# Patient Record
Sex: FEMALE | Race: WHITE | NOT HISPANIC OR LATINO | Employment: FULL TIME | URBAN - METROPOLITAN AREA
[De-identification: names, ages, dates, MRNs, and addresses within clinical notes are randomized per-mention and may not be internally consistent; named-entity substitution may affect disease eponyms.]

---

## 2018-11-28 ENCOUNTER — OFFICE VISIT (OUTPATIENT)
Dept: URGENT CARE | Facility: CLINIC | Age: 48
End: 2018-11-28

## 2018-11-28 VITALS
HEART RATE: 100 BPM | TEMPERATURE: 99 F | WEIGHT: 188 LBS | BODY MASS INDEX: 30.22 KG/M2 | HEIGHT: 66 IN | RESPIRATION RATE: 18 BRPM | OXYGEN SATURATION: 96 % | DIASTOLIC BLOOD PRESSURE: 75 MMHG | SYSTOLIC BLOOD PRESSURE: 113 MMHG

## 2018-11-28 DIAGNOSIS — J20.9 ACUTE BRONCHITIS, UNSPECIFIED ORGANISM: Primary | ICD-10-CM

## 2018-11-28 PROCEDURE — 96372 THER/PROPH/DIAG INJ SC/IM: CPT | Mod: S$GLB,,, | Performed by: FAMILY MEDICINE

## 2018-11-28 PROCEDURE — 99202 OFFICE O/P NEW SF 15 MIN: CPT | Mod: S$GLB,,, | Performed by: FAMILY MEDICINE

## 2018-11-28 RX ORDER — BENZONATATE 100 MG/1
100 CAPSULE ORAL EVERY 6 HOURS PRN
Qty: 30 CAPSULE | Refills: 1 | Status: SHIPPED | OUTPATIENT
Start: 2018-11-28 | End: 2019-11-28

## 2018-11-28 RX ORDER — BETAMETHASONE SODIUM PHOSPHATE AND BETAMETHASONE ACETATE 3; 3 MG/ML; MG/ML
6 INJECTION, SUSPENSION INTRA-ARTICULAR; INTRALESIONAL; INTRAMUSCULAR; SOFT TISSUE
Status: COMPLETED | OUTPATIENT
Start: 2018-11-28 | End: 2018-11-28

## 2018-11-28 RX ORDER — ALBUTEROL SULFATE 90 UG/1
2 AEROSOL, METERED RESPIRATORY (INHALATION) EVERY 6 HOURS PRN
Qty: 18 G | Refills: 0 | Status: SHIPPED | OUTPATIENT
Start: 2018-11-28 | End: 2021-11-09

## 2018-11-28 RX ORDER — DOXYCYCLINE 100 MG/1
100 CAPSULE ORAL 2 TIMES DAILY
Qty: 14 CAPSULE | Refills: 0 | Status: SHIPPED | OUTPATIENT
Start: 2018-11-28 | End: 2018-12-05

## 2018-11-28 RX ADMIN — BETAMETHASONE SODIUM PHOSPHATE AND BETAMETHASONE ACETATE 6 MG: 3; 3 INJECTION, SUSPENSION INTRA-ARTICULAR; INTRALESIONAL; INTRAMUSCULAR; SOFT TISSUE at 11:11

## 2018-11-28 NOTE — PATIENT INSTRUCTIONS
Acute Bronchitis  Your healthcare provider has told you that you have acute bronchitis. Bronchitis is infection or inflammation of the bronchial tubes (airways in the lungs). Normally, air moves easily in and out of the airways. Bronchitis narrows the airways, making it harder for air to flow in and out of the lungs. This causes symptoms such as shortness of breath, coughing up yellow or green mucus, and wheezing. Bronchitis can be acute or chronic. Acute means the condition comes on quickly and goes away in a short time, usually within 3 to 10 days. Chronic means a condition lasts a long time and often comes back.    What causes acute bronchitis?  Acute bronchitis almost always starts as a viral respiratory infection, such as a cold or the flu. Certain factors make it more likely for a cold or flu to turn into bronchitis. These include being very young, being elderly, having a heart or lung problem, or having a weak immune system. Cigarette smoking also makes bronchitis more likely.  When bronchitis develops, the airways become swollen. The airways may also become infected with bacteria. This is known as a secondary infection.  Diagnosing acute bronchitis  Your healthcare provider will examine you and ask about your symptoms and health history. You may also have a sputum culture to test the fluid in your lungs. Chest X-rays may be done to look for infection in the lungs.  Treating acute bronchitis  Bronchitis usually clears up as the cold or flu goes away. You can help feel better faster by doing the following:  · Take medicine as directed. You may be told to take ibuprofen or other over-the-counter medicines. These help relieve inflammation in your bronchial tubes. Your healthcare provider may prescribe an inhaler to help open up the bronchial tubes. Most of the time, acute bronchitis is caused by a viral infection. Antibiotics are usually not prescribed for viral infections.  · Drink plenty of fluids, such as  water, juice, or warm soup. Fluids loosen mucus so that you can cough it up. This helps you breathe more easily. Fluids also prevent dehydration.  · Make sure you get plenty of rest.  · Do not smoke. Do not allow anyone else to smoke in your home.  Recovery and follow-up  Follow up with your doctor as you are told. You will likely feel better in a week or two. But a dry cough can linger beyond that time. Let your doctor know if you still have symptoms (other than a dry cough) after 2 weeks, or if youre prone to getting bronchial infections. Take steps to protect yourself from future infections. These steps include stopping smoking and avoiding tobacco smoke, washing your hands often, and getting a yearly flu shot.  When to call your healthcare provider  Call the healthcare provider if you have any of the following:  · Fever of 100.4°F (38.0°C) or higher, or as advised  · Symptoms that get worse, or new symptoms  · Trouble breathing  · Symptoms that dont start to improve within a week, or within 3 days of taking antibiotics   Date Last Reviewed: 12/1/2016  © 5046-3873 [employer]. [address] [place] [address], [place] [address]. All rights reserved. This information is not intended as a substitute for professional medical care. Always follow your healthcare professional's instructions.

## 2018-11-28 NOTE — PROGRESS NOTES
"Subjective:       Patient ID: Sakshi Plummer is a 48 y.o. female.    Vitals:  height is 5' 6" (1.676 m) and weight is 85.3 kg (188 lb). Her oral temperature is 99.2 °F (37.3 °C). Her blood pressure is 113/75 and her pulse is 100. Her respiration is 18 and oxygen saturation is 96%.     Chief Complaint: Sore Throat and Cough    This is a 48 y.o. female who presents today with a chief complaint of hoarseness, chest and head congestion and cough. Took Nyquil, Mucinex, Zertec and Vicks vapor rub without relief. Felt light headed taking a shower this morning.      Cough   This is a new problem. The current episode started in the past 7 days. The problem has been gradually worsening. The problem occurs every few minutes. The cough is productive of purulent sputum. Associated symptoms include headaches, nasal congestion, postnasal drip, rhinorrhea and a sore throat. Pertinent negatives include no chills, ear congestion, ear pain, eye redness, fever, hemoptysis, myalgias, rash, shortness of breath or wheezing. The symptoms are aggravated by lying down. She has tried OTC cough suppressant for the symptoms. The treatment provided mild relief. There is no history of asthma or environmental allergies.   Sinus Problem   This is a new problem. The current episode started in the past 7 days. The problem has been gradually worsening since onset. Her pain is at a severity of 8/10. The pain is moderate. Associated symptoms include congestion, coughing, headaches, a hoarse voice, sinus pressure, sneezing and a sore throat. Pertinent negatives include no chills, diaphoresis, ear pain or shortness of breath. Past treatments include oral decongestants, lying down and acetaminophen.       Constitution: Negative for chills, sweating, fatigue and fever.   HENT: Positive for congestion, postnasal drip, sinus pressure and sore throat. Negative for ear pain, sinus pain and voice change.    Neck: Negative for painful lymph nodes.   Eyes: " Negative for eye redness.   Respiratory: Positive for cough and sputum production. Negative for chest tightness, bloody sputum, COPD, shortness of breath, stridor, wheezing and asthma.    Gastrointestinal: Negative for nausea and vomiting.   Musculoskeletal: Negative for muscle ache.   Skin: Negative for rash.   Allergic/Immunologic: Positive for sneezing. Negative for environmental allergies, seasonal allergies and asthma.   Neurological: Positive for light-headedness and headaches.   Hematologic/Lymphatic: Negative for swollen lymph nodes.       Objective:      Physical Exam   Constitutional: She appears well-developed and well-nourished.   HENT:   Head: Normocephalic and atraumatic.   Nose: Mucosal edema and rhinorrhea present. Right sinus exhibits maxillary sinus tenderness and frontal sinus tenderness. Left sinus exhibits maxillary sinus tenderness and frontal sinus tenderness.   Mouth/Throat: Posterior oropharyngeal erythema present.   Eyes: EOM are normal. Pupils are equal, round, and reactive to light.   Neck: Normal range of motion. Neck supple.   Cardiovascular: Normal rate, regular rhythm and normal heart sounds.   Pulmonary/Chest: Effort normal. No respiratory distress. She has no wheezes (rhoncherous breath sounds).   Nursing note and vitals reviewed.      Assessment:       1. Acute bronchitis, unspecified organism        Plan:         Acute bronchitis, unspecified organism  -     Cancel: POCT Influenza A/B  -     doxycycline (VIBRAMYCIN) 100 MG Cap; Take 1 capsule (100 mg total) by mouth 2 (two) times daily. for 7 days  Dispense: 14 capsule; Refill: 0  -     benzonatate (TESSALON PERLES) 100 MG capsule; Take 1 capsule (100 mg total) by mouth every 6 (six) hours as needed for Cough.  Dispense: 30 capsule; Refill: 1  -     albuterol (VENTOLIN HFA) 90 mcg/actuation inhaler; Inhale 2 puffs into the lungs every 6 (six) hours as needed for Wheezing. Rescue  Dispense: 18 g; Refill: 0  -     betamethasone  acetate-betamethasone sodium phosphate injection 6 mg    Patient declines influenza testing

## 2021-11-08 DIAGNOSIS — M19.071 PRIMARY OSTEOARTHRITIS OF ANKLES, BILATERAL: Primary | ICD-10-CM

## 2021-11-08 DIAGNOSIS — M19.079 PRIMARY LOCALIZED OSTEOARTHROSIS, ANKLE AND FOOT: ICD-10-CM

## 2021-11-08 DIAGNOSIS — M19.072 PRIMARY OSTEOARTHRITIS OF ANKLES, BILATERAL: Primary | ICD-10-CM

## 2021-11-09 ENCOUNTER — HOSPITAL ENCOUNTER (OUTPATIENT)
Dept: RADIOLOGY | Facility: HOSPITAL | Age: 51
Discharge: HOME | End: 2021-11-09
Attending: FAMILY MEDICINE
Payer: MEDICAID

## 2021-11-09 ENCOUNTER — OFFICE VISIT (OUTPATIENT)
Dept: FAMILY MEDICINE | Facility: CLINIC | Age: 51
End: 2021-11-09
Payer: MEDICAID

## 2021-11-09 VITALS
WEIGHT: 196 LBS | SYSTOLIC BLOOD PRESSURE: 120 MMHG | TEMPERATURE: 98 F | HEIGHT: 66 IN | HEART RATE: 76 BPM | DIASTOLIC BLOOD PRESSURE: 84 MMHG | BODY MASS INDEX: 31.5 KG/M2

## 2021-11-09 DIAGNOSIS — M19.079 PRIMARY LOCALIZED OSTEOARTHROSIS, ANKLE AND FOOT: ICD-10-CM

## 2021-11-09 DIAGNOSIS — Z12.11 SCREENING FOR MALIGNANT NEOPLASM OF COLON: ICD-10-CM

## 2021-11-09 DIAGNOSIS — M19.072 PRIMARY OSTEOARTHRITIS OF ANKLES, BILATERAL: ICD-10-CM

## 2021-11-09 DIAGNOSIS — Z23 FLU VACCINE NEED: ICD-10-CM

## 2021-11-09 DIAGNOSIS — F41.9 ANXIETY: Primary | ICD-10-CM

## 2021-11-09 DIAGNOSIS — Z12.31 OTHER SCREENING MAMMOGRAM: ICD-10-CM

## 2021-11-09 DIAGNOSIS — M20.12 HALLUX VALGUS (ACQUIRED), LEFT FOOT: ICD-10-CM

## 2021-11-09 DIAGNOSIS — M19.071 PRIMARY OSTEOARTHRITIS OF ANKLES, BILATERAL: ICD-10-CM

## 2021-11-09 PROCEDURE — 99204 PR OFFICE/OUTPT VISIT, NEW, LEVL IV, 45-59 MIN: ICD-10-PCS | Mod: 25,S$GLB,, | Performed by: PHYSICIAN ASSISTANT

## 2021-11-09 PROCEDURE — 99204 OFFICE O/P NEW MOD 45 MIN: CPT | Mod: 25,S$GLB,, | Performed by: PHYSICIAN ASSISTANT

## 2021-11-09 PROCEDURE — 73630 X-RAY EXAM OF FOOT: CPT | Mod: TC,50,PO

## 2021-11-09 PROCEDURE — 90682 RIV4 VACC RECOMBINANT DNA IM: CPT | Mod: S$GLB,,, | Performed by: PHYSICIAN ASSISTANT

## 2021-11-09 PROCEDURE — 90471 FLU VACCINE - QUADRIVALENT (RECOMBINANT) PRESERVATIVE FREE: ICD-10-PCS | Mod: S$GLB,,, | Performed by: PHYSICIAN ASSISTANT

## 2021-11-09 PROCEDURE — 73610 X-RAY EXAM OF ANKLE: CPT | Mod: TC,50,PO

## 2021-11-09 PROCEDURE — 90682 FLU VACCINE - QUADRIVALENT (RECOMBINANT) PRESERVATIVE FREE: ICD-10-PCS | Mod: S$GLB,,, | Performed by: PHYSICIAN ASSISTANT

## 2021-11-09 PROCEDURE — 90471 IMMUNIZATION ADMIN: CPT | Mod: S$GLB,,, | Performed by: PHYSICIAN ASSISTANT

## 2021-11-09 RX ORDER — POTASSIUM CHLORIDE 750 MG/1
10 TABLET, EXTENDED RELEASE ORAL ONCE
COMMUNITY
End: 2021-11-09

## 2021-11-09 RX ORDER — PROPRANOLOL HYDROCHLORIDE 60 MG/1
60 TABLET ORAL 3 TIMES DAILY
COMMUNITY
End: 2021-11-09

## 2021-11-09 RX ORDER — PRAVASTATIN SODIUM 10 MG/1
10 TABLET ORAL DAILY
COMMUNITY
End: 2021-11-09

## 2021-11-09 RX ORDER — GABAPENTIN 100 MG/1
100 CAPSULE ORAL 2 TIMES DAILY
COMMUNITY
End: 2022-06-13

## 2021-11-09 RX ORDER — CETIRIZINE HYDROCHLORIDE 10 MG/1
10 TABLET ORAL DAILY
COMMUNITY

## 2021-11-09 RX ORDER — SPIRONOLACTONE 25 MG/1
25 TABLET ORAL DAILY
COMMUNITY
End: 2021-11-09

## 2021-11-09 RX ORDER — ESCITALOPRAM OXALATE 5 MG/1
5 TABLET ORAL DAILY
Qty: 30 TABLET | Refills: 2 | Status: SHIPPED | OUTPATIENT
Start: 2021-11-09 | End: 2022-02-10

## 2021-11-09 RX ORDER — ESCITALOPRAM OXALATE 20 MG/1
20 TABLET ORAL DAILY
COMMUNITY
End: 2021-11-09 | Stop reason: SDUPTHER

## 2021-11-15 ENCOUNTER — OFFICE VISIT (OUTPATIENT)
Dept: PODIATRY | Facility: CLINIC | Age: 51
End: 2021-11-15
Payer: MEDICAID

## 2021-11-15 VITALS — RESPIRATION RATE: 16 BRPM | BODY MASS INDEX: 31.5 KG/M2 | HEIGHT: 66 IN | WEIGHT: 196 LBS

## 2021-11-15 DIAGNOSIS — M20.41 HAMMER TOES OF BOTH FEET: ICD-10-CM

## 2021-11-15 DIAGNOSIS — M79.672 PAIN IN BOTH FEET: ICD-10-CM

## 2021-11-15 DIAGNOSIS — M20.42 HAMMER TOES OF BOTH FEET: ICD-10-CM

## 2021-11-15 DIAGNOSIS — M79.671 PAIN IN BOTH FEET: ICD-10-CM

## 2021-11-15 DIAGNOSIS — M79.672 LEFT FOOT PAIN: ICD-10-CM

## 2021-11-15 DIAGNOSIS — M76.72 PERONEAL TENDINITIS OF LEFT LOWER EXTREMITY: Primary | ICD-10-CM

## 2021-11-15 DIAGNOSIS — M19.072 OSTEOARTHRITIS OF LEFT ANKLE OR FOOT: ICD-10-CM

## 2021-11-15 DIAGNOSIS — M20.12 VALGUS DEFORMITY OF BOTH GREAT TOES: ICD-10-CM

## 2021-11-15 DIAGNOSIS — M20.11 VALGUS DEFORMITY OF BOTH GREAT TOES: ICD-10-CM

## 2021-11-15 PROCEDURE — 99204 OFFICE O/P NEW MOD 45 MIN: CPT | Mod: S$GLB,,, | Performed by: PODIATRIST

## 2021-11-15 PROCEDURE — 99204 PR OFFICE/OUTPT VISIT, NEW, LEVL IV, 45-59 MIN: ICD-10-PCS | Mod: S$GLB,,, | Performed by: PODIATRIST

## 2021-11-15 RX ORDER — METHYLPREDNISOLONE 4 MG/1
TABLET ORAL
Qty: 1 EACH | Refills: 0 | Status: SHIPPED | OUTPATIENT
Start: 2021-11-15 | End: 2021-12-13

## 2021-11-23 ENCOUNTER — HOSPITAL ENCOUNTER (OUTPATIENT)
Dept: RADIOLOGY | Facility: HOSPITAL | Age: 51
Discharge: HOME | End: 2021-11-23
Attending: PHYSICIAN ASSISTANT
Payer: MEDICAID

## 2021-11-23 DIAGNOSIS — Z12.31 OTHER SCREENING MAMMOGRAM: ICD-10-CM

## 2021-11-23 PROCEDURE — 77067 SCR MAMMO BI INCL CAD: CPT | Mod: TC,PO

## 2021-12-13 ENCOUNTER — OFFICE VISIT (OUTPATIENT)
Dept: PODIATRY | Facility: CLINIC | Age: 51
End: 2021-12-13
Payer: MEDICAID

## 2021-12-13 ENCOUNTER — PATIENT MESSAGE (OUTPATIENT)
Dept: FAMILY MEDICINE | Facility: CLINIC | Age: 51
End: 2021-12-13
Payer: MEDICAID

## 2021-12-13 VITALS — WEIGHT: 196 LBS | HEIGHT: 66 IN | RESPIRATION RATE: 16 BRPM | BODY MASS INDEX: 31.5 KG/M2

## 2021-12-13 DIAGNOSIS — M19.072 OSTEOARTHRITIS OF LEFT ANKLE OR FOOT: ICD-10-CM

## 2021-12-13 DIAGNOSIS — M76.72 PERONEAL TENDINITIS OF LEFT LOWER EXTREMITY: Primary | ICD-10-CM

## 2021-12-13 DIAGNOSIS — G60.9 IDIOPATHIC PERIPHERAL NEUROPATHY: ICD-10-CM

## 2021-12-13 DIAGNOSIS — M79.672 LEFT FOOT PAIN: ICD-10-CM

## 2021-12-13 PROCEDURE — 4010F ACE/ARB THERAPY RXD/TAKEN: CPT | Mod: CPTII,S$GLB,, | Performed by: PODIATRIST

## 2021-12-13 PROCEDURE — 99214 OFFICE O/P EST MOD 30 MIN: CPT | Mod: S$GLB,,, | Performed by: PODIATRIST

## 2021-12-13 PROCEDURE — 4010F PR ACE/ARB THEARPY RXD/TAKEN: ICD-10-PCS | Mod: CPTII,S$GLB,, | Performed by: PODIATRIST

## 2021-12-13 PROCEDURE — 99214 PR OFFICE/OUTPT VISIT, EST, LEVL IV, 30-39 MIN: ICD-10-PCS | Mod: S$GLB,,, | Performed by: PODIATRIST

## 2021-12-14 ENCOUNTER — OFFICE VISIT (OUTPATIENT)
Dept: FAMILY MEDICINE | Facility: CLINIC | Age: 51
End: 2021-12-14
Payer: MEDICAID

## 2021-12-14 ENCOUNTER — TELEPHONE (OUTPATIENT)
Dept: FAMILY MEDICINE | Facility: CLINIC | Age: 51
End: 2021-12-14

## 2021-12-14 VITALS
HEART RATE: 72 BPM | OXYGEN SATURATION: 100 % | DIASTOLIC BLOOD PRESSURE: 68 MMHG | BODY MASS INDEX: 31.66 KG/M2 | TEMPERATURE: 99 F | SYSTOLIC BLOOD PRESSURE: 112 MMHG | HEIGHT: 66 IN | WEIGHT: 197 LBS

## 2021-12-14 DIAGNOSIS — F51.01 PRIMARY INSOMNIA: Primary | ICD-10-CM

## 2021-12-14 DIAGNOSIS — M20.12 HALLUX VALGUS (ACQUIRED), LEFT FOOT: ICD-10-CM

## 2021-12-14 DIAGNOSIS — F41.9 ANXIETY: ICD-10-CM

## 2021-12-14 PROCEDURE — 4010F ACE/ARB THERAPY RXD/TAKEN: CPT | Mod: S$GLB,,, | Performed by: PHYSICIAN ASSISTANT

## 2021-12-14 PROCEDURE — 4010F PR ACE/ARB THEARPY RXD/TAKEN: ICD-10-PCS | Mod: S$GLB,,, | Performed by: PHYSICIAN ASSISTANT

## 2021-12-14 PROCEDURE — 99214 OFFICE O/P EST MOD 30 MIN: CPT | Mod: S$GLB,,, | Performed by: PHYSICIAN ASSISTANT

## 2021-12-14 PROCEDURE — 99214 PR OFFICE/OUTPT VISIT, EST, LEVL IV, 30-39 MIN: ICD-10-PCS | Mod: S$GLB,,, | Performed by: PHYSICIAN ASSISTANT

## 2021-12-14 RX ORDER — TEMAZEPAM 15 MG/1
15 CAPSULE ORAL NIGHTLY PRN
Qty: 30 CAPSULE | Refills: 1 | Status: SHIPPED | OUTPATIENT
Start: 2021-12-14 | End: 2021-12-20

## 2021-12-20 ENCOUNTER — TELEPHONE (OUTPATIENT)
Dept: FAMILY MEDICINE | Facility: CLINIC | Age: 51
End: 2021-12-20
Payer: MEDICAID

## 2021-12-20 DIAGNOSIS — F51.01 PRIMARY INSOMNIA: ICD-10-CM

## 2021-12-20 DIAGNOSIS — F51.01 PRIMARY INSOMNIA: Primary | ICD-10-CM

## 2021-12-20 RX ORDER — MIRTAZAPINE 15 MG/1
15 TABLET, ORALLY DISINTEGRATING ORAL NIGHTLY
Qty: 30 TABLET | Refills: 2 | Status: SHIPPED | OUTPATIENT
Start: 2021-12-20 | End: 2021-12-20 | Stop reason: SDUPTHER

## 2021-12-20 RX ORDER — MIRTAZAPINE 15 MG/1
15 TABLET, ORALLY DISINTEGRATING ORAL NIGHTLY
Qty: 15 TABLET | Refills: 0 | Status: SHIPPED | OUTPATIENT
Start: 2021-12-20 | End: 2022-02-10

## 2022-02-10 ENCOUNTER — OFFICE VISIT (OUTPATIENT)
Dept: FAMILY MEDICINE | Facility: CLINIC | Age: 52
End: 2022-02-10
Payer: MEDICAID

## 2022-02-10 VITALS
HEIGHT: 66 IN | SYSTOLIC BLOOD PRESSURE: 118 MMHG | WEIGHT: 204 LBS | DIASTOLIC BLOOD PRESSURE: 70 MMHG | HEART RATE: 70 BPM | OXYGEN SATURATION: 97 % | BODY MASS INDEX: 32.78 KG/M2

## 2022-02-10 DIAGNOSIS — F51.01 PRIMARY INSOMNIA: Primary | ICD-10-CM

## 2022-02-10 DIAGNOSIS — Z12.11 SCREENING FOR MALIGNANT NEOPLASM OF COLON: ICD-10-CM

## 2022-02-10 PROCEDURE — 3008F PR BODY MASS INDEX (BMI) DOCUMENTED: ICD-10-PCS | Mod: S$GLB,,, | Performed by: PHYSICIAN ASSISTANT

## 2022-02-10 PROCEDURE — 3078F PR MOST RECENT DIASTOLIC BLOOD PRESSURE < 80 MM HG: ICD-10-PCS | Mod: S$GLB,,, | Performed by: PHYSICIAN ASSISTANT

## 2022-02-10 PROCEDURE — 99213 OFFICE O/P EST LOW 20 MIN: CPT | Mod: S$GLB,,, | Performed by: PHYSICIAN ASSISTANT

## 2022-02-10 PROCEDURE — 3008F BODY MASS INDEX DOCD: CPT | Mod: S$GLB,,, | Performed by: PHYSICIAN ASSISTANT

## 2022-02-10 PROCEDURE — 99213 PR OFFICE/OUTPT VISIT, EST, LEVL III, 20-29 MIN: ICD-10-PCS | Mod: S$GLB,,, | Performed by: PHYSICIAN ASSISTANT

## 2022-02-10 PROCEDURE — 3074F SYST BP LT 130 MM HG: CPT | Mod: S$GLB,,, | Performed by: PHYSICIAN ASSISTANT

## 2022-02-10 PROCEDURE — 3078F DIAST BP <80 MM HG: CPT | Mod: S$GLB,,, | Performed by: PHYSICIAN ASSISTANT

## 2022-02-10 PROCEDURE — 3074F PR MOST RECENT SYSTOLIC BLOOD PRESSURE < 130 MM HG: ICD-10-PCS | Mod: S$GLB,,, | Performed by: PHYSICIAN ASSISTANT

## 2022-02-10 RX ORDER — LORAZEPAM 0.5 MG/1
0.5 TABLET ORAL NIGHTLY
Qty: 30 TABLET | Refills: 1 | Status: SHIPPED | OUTPATIENT
Start: 2022-02-10 | End: 2022-04-22 | Stop reason: SDUPTHER

## 2022-02-10 NOTE — PATIENT INSTRUCTIONS
"Patient Education       Insomnia   The Basics   Written by the doctors and editors at [facility]   What is insomnia? -- Insomnia is a problem with sleep. People with insomnia have trouble falling or staying asleep, or they do not feel rested when they wake up. Insomnia is not about the number of hours of sleep a person gets. Everyone needs a different amount of sleep.  Short-term insomnia is when a person has trouble sleeping for a few days or weeks. This is usually related to temporary stress and often gets better on its own. Long-term or "chronic" insomnia is when sleep problems last for 3 months or longer.   What are the symptoms of insomnia? -- People with insomnia often:  · Have trouble falling or staying asleep  · Feel tired during the day  · Forget things or have trouble thinking clearly  · Get cranky, anxious, irritable, or depressed  · Have less energy or interest in doing things  · Make mistakes or get into accidents more often than normal  · Worry about their lack of sleep  These symptoms can be so bad that they affect a person's relationships or work life. They can happen even in people who seem to be sleeping enough hours.  Are there tests I should have? -- Probably not. Most people with insomnia need no tests. Your doctor or nurse will probably be able to tell what is wrong just by talking to you. They might also ask you to keep a daily log for 1 to 2 weeks, where you keep track of how you sleep each night.  In some cases, people do need special sleep tests, such as "polysomnography" or "actigraphy."  · Polysomnography - Polysomnography is a test that usually lasts all night. It can be done in a sleep lab or in your home. During the test, monitors are attached to your body to record movement, brain activity, breathing, and other body functions.  · Actigraphy - Actigraphy records activity and movement with a monitor or motion detector that is usually worn on the wrist. The test is done at home, over " "several days and nights. It will record how much you actually sleep and when.  Should I see a doctor or nurse? -- Yes. If you have insomnia, and it is troubling you, see your doctor or nurse. They might have suggestions on how to treat the problem.  How is insomnia treated? -- It depends. If your insomnia is related to stress, pain, or a medical problem, treating that problem can help you sleep better. If you have chronic insomnia, meaning insomnia that lasts longer than 3 months, there are specific treatments that can help. They include:  · Cognitive behavioral therapy - Cognitive behavioral therapy for insomnia, or "CBT-I," involves working with a counselor or therapist over several weeks. You will work on understanding your insomnia, learning ways to build better sleep habits, and changing negative thinking patterns that can make insomnia worse. Your therapist can also teach you relaxation exercises that can help.  Part of CBT-I involves learning about "sleep hygiene." These things can also be helpful for people who don't have chronic insomnia but have trouble sleeping sometimes. Having good sleep hygiene means you:  ? Sleep only long enough to feel rested and then get out of bed  ? Go to bed and get up at the same time every day  ? Do not try to force yourself to sleep. If you can't sleep, get out of bed and try again later.  ? Have coffee, tea, and other foods that have caffeine only in the morning  ? Avoid alcohol in the late afternoon, evening, and bedtime  ? Avoid smoking, especially in the evening  ? Keep your bedroom dark, cool, quiet, and free of reminders of work or other things that cause you stress  ? Solve problems you have before you go to bed  ? Get plenty of physical activity, but avoid heavy exercise right before bed  ? Avoid looking at phones, computer screens, or reading devices ("e-books") that give off light before bed. This can make it harder to fall asleep.  · Medicines - There are also " "medicines that can help with sleep. But doctors usually recommend trying cognitive behavioral therapy first. In some cases, they might recommend starting both at the same time. If your doctor or nurse thinks medicine might help you, they will talk to you about the benefits and risks. Doctors generally do not recommend over-the-counter "sleep aids" for treating chronic insomnia.  If your insomnia is related to problems like depression or anxiety, it can help to treat those problems directly.  Can I use alcohol to help me sleep? -- No, do not use alcohol as a sleep aid. Even though alcohol makes you sleepy at first, it disrupts sleep later in the night.  All topics are updated as new evidence becomes available and our peer review process is complete.  This topic retrieved from [employer] on: Sep 21, 2021.  Topic 42701 Version 12.0  Release: 29.4.2 - C29.263  © 2021 [employer] and/or its affiliates. All rights reserved.  table 1: [employer] sleep diary instructions  General instructions    What is a sleep diary?   A sleep diary is designed to gather information about your daily sleep pattern.   How often and when do I fill out the sleep diary?   It is necessary for you to complete your sleep diary every day. If possible, the sleep diary should be completed within one hour of getting out of bed in the morning.   What should I do if I miss a day?   If you forget to fill in the diary or are unable to finish it, leave the diary blank for that day.   What if something unusual affects my sleep or how I feel in the daytime?   If your sleep or daytime functioning is affected by some unusual event (such as an illness, or an emergency) you may make brief notes on your diary.   What do the words "bed" and "day" mean on the diary?   This diary can be used for people who are awake or asleep at unusual times. In the sleep diary, the word "day" is the time when you choose or are required to be awake. The term "bed" means the place " "where you usually sleep.   Will answering these questions about my sleep keep me awake?   This is not usually a problem. You should not worry about giving exact times, and you should not watch the clock. Just give your best estimate.   Sleep diary item instructions    Use the guide below to clarify what is being asked for each item of the sleep diary.   Date: Write the date of the morning you are filling out the diary.   1. What time did you get into bed?   Write the time that you got into bed. This may not be the time you began "trying" to fall asleep.   2. What time did you try to go to sleep?   Record the time that you began "trying" to fall asleep.   3. How long did it take you to fall asleep?   Beginning at the time you wrote in question 2, how long did it take you to fall asleep?   4. How many times did you wake up, not counting your final awakening?   How many times did you wake up between the time you first fell asleep and your final awakening?   5. In total, how long did these awakenings last?   What was the total time you were awake between the time you first fell asleep and your final awakening? For example, if you woke 3 times for 20 minutes, 35 minutes, and 15 minutes, add them all up (20 + 35 + 15 = 70 min or 1 hr and 10 min).   6a. What time was your final awakening?   Record the last time you woke up in the morning.   6b. After your final awakening, how long did you spend in bed trying to sleep?   After the last time you woke-up (item #6a), how many minutes did you spend in bed trying to sleep? For example, if you woke up at 8 am but continued to try and sleep until 9 am, record 1 hour.   6c. Did you wake up earlier than you planned?   If you woke up or were awakened earlier than you planned, check yes. If you woke up at your planned time, check no.   6d. If yes, how much earlier?   If you answered "yes" to question 6c, write the number of minutes you woke up earlier than you had planned on waking " "up. For example, if you woke up 15 minutes before the alarm went off, record 15 minutes here.   7. What time did you get out of bed for the day?   What time did you get out of bed with no further attempt at sleeping? This may be different from your final awakening time (eg, you may have woken up at 6:35 am but did not get out of bed to start your day until 7:20 am).   8. In total, how long did you sleep?   This should just be your best estimate, based on when you went to bed and woke up, how long it took you to fall asleep, and how long you were awake. You do not need to calculate this by adding and subtracting; just give your best estimate.   9. How would you rate the quality of your sleep?   "Sleep quality" is your sense of whether your sleep was good or poor.   10. How restful or refreshed did you feel when you woke up for the day?   This refers to how you felt after you were done sleeping for the night, during the first few minutes that you were awake.   11a. How many times did you nap or doze?   A nap is a time you decided to sleep during the day, whether in bed or not in bed. "Dozing" is a time you may have nodded off for a few minutes, without meaning to, such as while watching TV. Count all the times you napped or dozed at any time from when you first got out of bed in the morning until you got into bed again at night.   11b. In total, how long did you nap or doze?   Estimate the total amount of time you spent napping or dozing, in hours and minutes. For instance, if you napped twice, once for 30 minutes and once for 60 minutes, and dozed for 10 minutes, you would answer "1 hour 40 minutes." If you did not nap or doze, write "N/A" (not applicable).   12a. How many drinks containing alcohol did you have?   Enter the number of alcoholic drinks you had where 1 drink is defined as one 12 oz beer (can), 5 oz wine, or 1.5 oz liquor (one shot).   12b. What time was your last drink?   If you had an alcoholic drink " "yesterday, enter the time of day in hours and minutes of your last drink. If you did not have a drink, write "N/A" (not applicable).   13a. How many caffeinated drinks (coffee, tea, soda, energy drinks) did you have?   Enter the number of caffeinated drinks (coffee, tea, soda, energy drinks) you had where for coffee and tea, one drink = 6-8 oz; while for caffeinated soda one drink = 12 oz.   13b. What time was your last caffeinated drink?   If you had a caffeinated drink, enter the time of day in hours and minutes of your last drink. If you did not have a caffeinated drink, write "N/A" (not applicable).   14. Did you take any over-the-counter or prescription medication(s) to help you sleep?   If so, list medication(s), dose, and time taken: List the medication name, how much and when you took EACH different medication you took tonight to help you sleep. Include medication available over the counter, prescription medications, and herbals (example: "Sleepwell 50 mg 11 pm"). If every night is the same, write "same" after the first day.   15. Comments:   If you have anything that you would like to say that is relevant to your sleep feel free to write it here.   Graphic 15841 Version 4.0  Consumer Information Use and Disclaimer   This information is not specific medical advice and does not replace information you receive from your health care provider. This is only a brief summary of general information. It does NOT include all information about conditions, illnesses, injuries, tests, procedures, treatments, therapies, discharge instructions or life-style choices that may apply to you. You must talk with your health care provider for complete information about your health and treatment options. This information should not be used to decide whether or not to accept your health care provider's advice, instructions or recommendations. Only your health care provider has the knowledge and training to provide advice that is " right for you. The use of this information is governed by the [employer] End User License Agreement, available at [email or web address].The use of [employer] content is governed by the [employer] Terms of Use. ©2021 [employer]. All rights reserved.  Copyright   © 2021 [employer], Inc. and/or its affiliates. All rights reserved.

## 2022-02-10 NOTE — PROGRESS NOTES
"  SUBJECTIVE:    Patient ID: Sakshi Plummer is a 51 y.o. female.    Chief Complaint: Follow-up (3 m;o f/u//insomnia, would like a sleeping pill rx//no med bottles//colonoscopy ordered//tc)    Patient is a 50 yo female here today for a 2 month follow up for insomnia. At her last visit in Dec, she was prescribed temazepam which she stopped d/t chest pain. She then tried taking mirtazapine for 1 week but stopped because it was making her mouth numb and she was unable to taste any liquids. States she will sleep well about every 3 days purely due to exhaustion. Has difficulty falling asleep and staying asleep. She did start a difficult new job recently but reports many of her stressors have decreased since last year. She did stop taking lexapro around the holidays because she felt emotionless and "numb." Feels she has a good support system and coping skills.     Hx of peroneal tendinitis, follows with podiatry and wears a boot d/t a long term L foot pain. Still takes gabapentin although EMG showed no nerve damage.     Maintains a healthy diet and exercises consistently.  Reviewed labs (in media), stable at this time.        No visits with results within 6 Month(s) from this visit.   Latest known visit with results is:   No results found for any previous visit.       Past Medical History:   Diagnosis Date    Anemia     Anxiety     Arthritis     Depression     Encounter for blood transfusion 2009    iron deficency    Osteochondroma of right tibia      Past Surgical History:   Procedure Laterality Date     SECTION      COSMETIC SURGERY Bilateral 2004    Breast implants    GASTRIC BYPASS  2003    HYSTERECTOMY      KNEE SURGERY Right     OsteoChondroma removed     Family History   Problem Relation Age of Onset    Diabetes Mother     No Known Problems Father        Marital Status: Single  Alcohol History:  reports current alcohol use.  Tobacco History:  reports that she has never smoked. She " "has never used smokeless tobacco.  Drug History:  reports no history of drug use.    Review of patient's allergies indicates:   Allergen Reactions    Trazodone Other (See Comments)     nightmares       Current Outpatient Medications:     cetirizine (ZYRTEC) 10 MG tablet, Take 10 mg by mouth once daily., Disp: , Rfl:     gabapentin (NEURONTIN) 100 MG capsule, Take 100 mg by mouth 2 (two) times daily., Disp: , Rfl:     LORazepam (ATIVAN) 0.5 MG tablet, Take 1 tablet (0.5 mg total) by mouth every evening., Disp: 30 tablet, Rfl: 1    Review of Systems   Constitutional: Negative for activity change, fatigue, fever and unexpected weight change.   HENT: Negative for congestion.    Respiratory: Negative for apnea, cough, chest tightness and shortness of breath.    Cardiovascular: Negative for chest pain and palpitations.   Gastrointestinal: Negative for abdominal distention and abdominal pain.   Genitourinary: Negative for difficulty urinating and dyspareunia.   Musculoskeletal: Negative for arthralgias and back pain.        Chronic L foot pain   Neurological: Negative for dizziness and weakness.   Psychiatric/Behavioral: Positive for sleep disturbance. The patient is nervous/anxious.           Objective:      Vitals:    02/10/22 0801   BP: 118/70   Pulse: 70   SpO2: 97%   Weight: 92.5 kg (204 lb)   Height: 5' 6" (1.676 m)     Physical Exam  Constitutional:       General: She is not in acute distress.  HENT:      Head: Normocephalic and atraumatic.   Eyes:      Pupils: Pupils are equal, round, and reactive to light.   Cardiovascular:      Rate and Rhythm: Normal rate and regular rhythm.      Heart sounds: Normal heart sounds.   Pulmonary:      Effort: Pulmonary effort is normal.      Breath sounds: Normal breath sounds.   Abdominal:      General: Bowel sounds are normal. There is no distension.      Palpations: Abdomen is soft.      Tenderness: There is no abdominal tenderness.   Musculoskeletal:         General: " Tenderness present. Normal range of motion.      Cervical back: Normal range of motion and neck supple.      Comments: Patient wearing boot for peroneal tendinitis of left foot.    Skin:     General: Skin is warm and dry.      Findings: No erythema or rash.   Neurological:      Mental Status: She is alert and oriented to person, place, and time.      Cranial Nerves: No cranial nerve deficit.   Psychiatric:         Mood and Affect: Mood normal.         Behavior: Behavior normal.         Thought Content: Thought content normal.         Judgment: Judgment normal.           Assessment:       1. Primary insomnia    2. Screening for malignant neoplasm of colon         Plan:       Primary insomnia  Comments:  Going to try with low dose ativan now qhs. Will plan to followup in 2 mos.  Orders:  -     LORazepam (ATIVAN) 0.5 MG tablet; Take 1 tablet (0.5 mg total) by mouth every evening.  Dispense: 30 tablet; Refill: 1    Screening for malignant neoplasm of colon  -     Ambulatory referral/consult to Gastroenterology      Follow up in about 2 months (around 4/10/2022).        2/10/2022 Luis Alberto Guzman PA-C

## 2022-02-22 ENCOUNTER — TELEPHONE (OUTPATIENT)
Dept: FAMILY MEDICINE | Facility: CLINIC | Age: 52
End: 2022-02-22
Payer: MEDICAID

## 2022-02-22 DIAGNOSIS — Z12.11 ENCOUNTER FOR COLORECTAL CANCER SCREENING: Primary | ICD-10-CM

## 2022-02-22 DIAGNOSIS — Z12.12 ENCOUNTER FOR COLORECTAL CANCER SCREENING: Primary | ICD-10-CM

## 2022-02-23 ENCOUNTER — TELEPHONE (OUTPATIENT)
Dept: FAMILY MEDICINE | Facility: CLINIC | Age: 52
End: 2022-02-23
Payer: MEDICAID

## 2022-02-23 DIAGNOSIS — Z12.11 SPECIAL SCREENING FOR MALIGNANT NEOPLASMS, COLON: Primary | ICD-10-CM

## 2022-02-24 ENCOUNTER — TELEPHONE (OUTPATIENT)
Dept: GASTROENTEROLOGY | Facility: CLINIC | Age: 52
End: 2022-02-24
Payer: MEDICAID

## 2022-02-24 DIAGNOSIS — Z01.818 PRE-OP TESTING: ICD-10-CM

## 2022-02-24 DIAGNOSIS — Z12.11 COLON CANCER SCREENING: Primary | ICD-10-CM

## 2022-02-24 NOTE — TELEPHONE ENCOUNTER
Colonoscopy scheduled with patient: 4/11/2022 0830 present 0730 . Mag Citrate prep. Unable to access labs. Per patient, January 2022 PCP reviewed at visit. No kidney issues mentioned. Covid swab 4/8/2022 0810. Questions asked about: bowel changes, painful movements, diarrhea, constipation, abdominal pain, visible blood in stool, acid reflux. Patient denies any issues. Asked about kidney issues/concerns, patient denies. Reviewed instructions with patient with their voicing understanding. Instructions via portal.

## 2022-04-07 ENCOUNTER — TELEPHONE (OUTPATIENT)
Dept: GASTROENTEROLOGY | Facility: CLINIC | Age: 52
End: 2022-04-07
Payer: MEDICAID

## 2022-04-07 NOTE — TELEPHONE ENCOUNTER
----- Message from Joyce Hernandez sent at 4/7/2022 10:21 AM CDT -----  Type: Needs Medical Advice  Who Called:  Patient  Best Call Back Number:   Additional Information: Calling to speak with the nurse to discuss her procedure

## 2022-04-11 ENCOUNTER — HOSPITAL ENCOUNTER (OUTPATIENT)
Facility: HOSPITAL | Age: 52
Discharge: HOME | End: 2022-04-11
Attending: INTERNAL MEDICINE | Admitting: INTERNAL MEDICINE
Payer: MEDICAID

## 2022-04-11 ENCOUNTER — ANESTHESIA (OUTPATIENT)
Dept: ENDOSCOPY | Facility: HOSPITAL | Age: 52
End: 2022-04-11
Payer: MEDICAID

## 2022-04-11 ENCOUNTER — ANESTHESIA EVENT (OUTPATIENT)
Dept: ENDOSCOPY | Facility: HOSPITAL | Age: 52
End: 2022-04-11
Payer: MEDICAID

## 2022-04-11 VITALS
WEIGHT: 195 LBS | RESPIRATION RATE: 16 BRPM | TEMPERATURE: 97 F | BODY MASS INDEX: 31.34 KG/M2 | SYSTOLIC BLOOD PRESSURE: 107 MMHG | HEART RATE: 62 BPM | HEIGHT: 66 IN | OXYGEN SATURATION: 100 % | DIASTOLIC BLOOD PRESSURE: 57 MMHG

## 2022-04-11 DIAGNOSIS — K64.8 INTERNAL HEMORRHOIDS: ICD-10-CM

## 2022-04-11 DIAGNOSIS — K57.90 DIVERTICULOSIS: ICD-10-CM

## 2022-04-11 DIAGNOSIS — Z12.11 COLON CANCER SCREENING: ICD-10-CM

## 2022-04-11 DIAGNOSIS — K63.5 POLYP OF COLON, UNSPECIFIED PART OF COLON, UNSPECIFIED TYPE: Primary | ICD-10-CM

## 2022-04-11 PROCEDURE — D9220A PRA ANESTHESIA: ICD-10-PCS | Mod: ANES,,, | Performed by: ANESTHESIOLOGY

## 2022-04-11 PROCEDURE — D9220A PRA ANESTHESIA: ICD-10-PCS | Mod: CRNA,,, | Performed by: NURSE ANESTHETIST, CERTIFIED REGISTERED

## 2022-04-11 PROCEDURE — 27201028 HC NEEDLE, SCLERO: Performed by: INTERNAL MEDICINE

## 2022-04-11 PROCEDURE — 88305 TISSUE EXAM BY PATHOLOGIST: ICD-10-PCS | Mod: 26,,, | Performed by: PATHOLOGY

## 2022-04-11 PROCEDURE — 45385 COLONOSCOPY W/LESION REMOVAL: CPT | Mod: PT | Performed by: INTERNAL MEDICINE

## 2022-04-11 PROCEDURE — 45385 COLONOSCOPY W/LESION REMOVAL: CPT | Mod: 33,,, | Performed by: INTERNAL MEDICINE

## 2022-04-11 PROCEDURE — 88305 TISSUE EXAM BY PATHOLOGIST: CPT | Mod: 59 | Performed by: PATHOLOGY

## 2022-04-11 PROCEDURE — 45381 COLONOSCOPY SUBMUCOUS NJX: CPT | Performed by: INTERNAL MEDICINE

## 2022-04-11 PROCEDURE — 25000003 PHARM REV CODE 250: Performed by: INTERNAL MEDICINE

## 2022-04-11 PROCEDURE — 00811 ANES LWR INTST NDSC NOS: CPT | Performed by: INTERNAL MEDICINE

## 2022-04-11 PROCEDURE — 27201089 HC SNARE, DISP (ANY): Performed by: INTERNAL MEDICINE

## 2022-04-11 PROCEDURE — 27200997: Performed by: INTERNAL MEDICINE

## 2022-04-11 PROCEDURE — 45380 PR COLONOSCOPY,BIOPSY: ICD-10-PCS | Mod: 59,,, | Performed by: INTERNAL MEDICINE

## 2022-04-11 PROCEDURE — 45381 PR COLONOSCPY,FLEX,W/DIR SUBMUC INJECT: ICD-10-PCS | Mod: 51,,, | Performed by: INTERNAL MEDICINE

## 2022-04-11 PROCEDURE — 63600175 PHARM REV CODE 636 W HCPCS: Performed by: NURSE ANESTHETIST, CERTIFIED REGISTERED

## 2022-04-11 PROCEDURE — 45381 COLONOSCOPY SUBMUCOUS NJX: CPT | Mod: 51,,, | Performed by: INTERNAL MEDICINE

## 2022-04-11 PROCEDURE — 45385 PR COLONOSCOPY,REMV LESN,SNARE: ICD-10-PCS | Mod: 33,,, | Performed by: INTERNAL MEDICINE

## 2022-04-11 PROCEDURE — 88305 TISSUE EXAM BY PATHOLOGIST: CPT | Mod: 26,,, | Performed by: PATHOLOGY

## 2022-04-11 PROCEDURE — D9220A PRA ANESTHESIA: Mod: ANES,,, | Performed by: ANESTHESIOLOGY

## 2022-04-11 PROCEDURE — 45380 COLONOSCOPY AND BIOPSY: CPT | Mod: 59 | Performed by: INTERNAL MEDICINE

## 2022-04-11 PROCEDURE — 45380 COLONOSCOPY AND BIOPSY: CPT | Mod: 59,,, | Performed by: INTERNAL MEDICINE

## 2022-04-11 PROCEDURE — D9220A PRA ANESTHESIA: Mod: CRNA,,, | Performed by: NURSE ANESTHETIST, CERTIFIED REGISTERED

## 2022-04-11 PROCEDURE — 37000008 HC ANESTHESIA 1ST 15 MINUTES: Performed by: INTERNAL MEDICINE

## 2022-04-11 PROCEDURE — 25000003 PHARM REV CODE 250: Performed by: NURSE ANESTHETIST, CERTIFIED REGISTERED

## 2022-04-11 PROCEDURE — 27201012 HC FORCEPS, HOT/COLD, DISP: Performed by: INTERNAL MEDICINE

## 2022-04-11 PROCEDURE — 37000009 HC ANESTHESIA EA ADD 15 MINS: Performed by: INTERNAL MEDICINE

## 2022-04-11 RX ORDER — DEXTROMETHORPHAN/PSEUDOEPHED 2.5-7.5/.8
DROPS ORAL
Status: COMPLETED | OUTPATIENT
Start: 2022-04-11 | End: 2022-04-11

## 2022-04-11 RX ORDER — PROPOFOL 10 MG/ML
VIAL (ML) INTRAVENOUS
Status: DISCONTINUED | OUTPATIENT
Start: 2022-04-11 | End: 2022-04-11

## 2022-04-11 RX ORDER — LIDOCAINE HCL/PF 100 MG/5ML
SYRINGE (ML) INTRAVENOUS
Status: DISCONTINUED | OUTPATIENT
Start: 2022-04-11 | End: 2022-04-11

## 2022-04-11 RX ORDER — SODIUM CHLORIDE 9 MG/ML
INJECTION, SOLUTION INTRAVENOUS CONTINUOUS
Status: DISCONTINUED | OUTPATIENT
Start: 2022-04-11 | End: 2022-04-11 | Stop reason: HOSPADM

## 2022-04-11 RX ADMIN — LIDOCAINE HYDROCHLORIDE 100 MG: 20 INJECTION INTRAVENOUS at 08:04

## 2022-04-11 RX ADMIN — PROPOFOL 40 MG: 10 INJECTION, EMULSION INTRAVENOUS at 08:04

## 2022-04-11 RX ADMIN — PROPOFOL 100 MG: 10 INJECTION, EMULSION INTRAVENOUS at 08:04

## 2022-04-11 RX ADMIN — PROPOFOL 40 MG: 10 INJECTION, EMULSION INTRAVENOUS at 09:04

## 2022-04-11 RX ADMIN — SIMETHICONE 20 MG: 20 SUSPENSION/ DROPS ORAL at 09:04

## 2022-04-11 RX ADMIN — PROPOFOL 30 MG: 10 INJECTION, EMULSION INTRAVENOUS at 08:04

## 2022-04-11 RX ADMIN — SODIUM CHLORIDE: 0.9 INJECTION, SOLUTION INTRAVENOUS at 08:04

## 2022-04-11 NOTE — PROGRESS NOTES
Patient and spouse given discharge instructions.  Patient awake, alert and oriented. Peripheral IV removed with catheter intact.  All belongings given back to patient. Patient transferred to car via wheelchair and left with spouse to home.

## 2022-04-11 NOTE — ANESTHESIA POSTPROCEDURE EVALUATION
Anesthesia Post Evaluation    Patient: Sakshi Plummer    Procedure(s) Performed: Procedure(s) (LRB):  COLONOSCOPY (N/A)    Final Anesthesia Type: general      Patient location during evaluation: PACU  Patient participation: Yes- Able to Participate  Level of consciousness: awake and alert and oriented  Post-procedure vital signs: reviewed and stable  Pain management: adequate  Airway patency: patent    PONV status at discharge: No PONV  Anesthetic complications: no      Cardiovascular status: blood pressure returned to baseline and stable  Respiratory status: unassisted and spontaneous ventilation  Hydration status: euvolemic  Follow-up not needed.          Vitals Value Taken Time   /57 04/11/22 0950   Temp 36.3 °C (97.3 °F) 04/11/22 0945   Pulse 66 04/11/22 0950   Resp 16 04/11/22 0930   SpO2 100 % 04/11/22 0950   Vitals shown include unvalidated device data.      Event Time   Out of Recovery 09:59:28         Pain/Gabriela Score: Gabriela Score: 10 (4/11/2022  9:45 AM)

## 2022-04-11 NOTE — H&P
CC: Screening for colorectal cancer - first occurrence    52 year old female with above. States that symptoms are absent, no alleviating/exacerbating factors. No family history of colorectal CA. No personal history of polyps. No bleeding or weight loss.     ROS:  No headache, no fever/chills, no chest pain/SOB, no nausea/vomiting/diarrhea/constipation/GI bleeding/abdominal pain, no dysuria/hematuria.    VSSAF   Exam:   Alert and oriented x 3; no apparent distress   PERRLA, sclera anicteric  CV: Regular rate/rhythm, normal PMI   Lungs: Clear bilaterally with no wheeze/rales   Abdomen: Soft, NT/ND, normal bowel sounds   Ext: No cyanosis, clubbing     Impression:   As above    Plan:   Proceed with endoscopy. Further recs to follow.

## 2022-04-11 NOTE — TRANSFER OF CARE
"Anesthesia Transfer of Care Note    Patient: Sakshi Plummer    Procedure(s) Performed: Procedure(s) (LRB):  COLONOSCOPY (N/A)    Patient location: PACU    Anesthesia Type: general    Transport from OR: Transported from OR on 2-3 L/min O2 by NC with adequate spontaneous ventilation    Post pain: adequate analgesia    Post assessment: no apparent anesthetic complications and tolerated procedure well    Post vital signs: stable    Level of consciousness: awake, alert and oriented    Nausea/Vomiting: no nausea/vomiting    Complications: none    Transfer of care protocol was followed      Last vitals:   Visit Vitals  BP (!) 140/64   Pulse 73   Temp 36.8 °C (98.2 °F) (Skin)   Resp 18   Ht 5' 6" (1.676 m)   Wt 88.5 kg (195 lb)   SpO2 97%   Breastfeeding No   BMI 31.47 kg/m²     "

## 2022-04-11 NOTE — ANESTHESIA PREPROCEDURE EVALUATION
04/11/2022  Sakshi Plummer is a 52 y.o., female.      Pre-op Assessment    I have reviewed the Patient Summary Reports.     I have reviewed the Nursing Notes. I have reviewed the NPO Status.   I have reviewed the Medications.     Review of Systems  Anesthesia Hx:  No problems with previous Anesthesia    Social:  Non-Smoker    Hematology/Oncology:         -- Anemia:   Cardiovascular:  Cardiovascular Normal     Pulmonary:  Pulmonary Normal    Renal/:  Renal/ Normal     Neurological:  Neurology Normal    Endocrine:  Endocrine Normal    Psych:   Psychiatric History anxiety depression          Physical Exam  General: Well nourished, Cooperative, Alert and Oriented    Airway:  Mallampati: I   Mouth Opening: Normal  Neck ROM: Normal ROM        Anesthesia Plan  Type of Anesthesia, risks & benefits discussed:    Anesthesia Type: Gen ETT, Gen Supraglottic Airway, Gen Natural Airway, MAC  Intra-op Monitoring Plan: Standard ASA Monitors  Post Op Pain Control Plan: multimodal analgesia  Induction:  IV  Airway Plan: Direct, Video and Fiberoptic, Post-Induction  Informed Consent: Informed consent signed with the Patient and all parties understand the risks and agree with anesthesia plan.  All questions answered.   ASA Score: 2    Ready For Surgery From Anesthesia Perspective.     .

## 2022-04-11 NOTE — PROVATION PATIENT INSTRUCTIONS
Discharge Summary/Instructions after an Endoscopic Procedure  Patient Name: Sakshi Zamora  Patient MRN: [id number]  Patient YOB: 1970  Monday, April 11, 2022  Brian Morel MD  Dear patient,  As a result of recent federal legislation (The Federal Cures Act), you may   receive lab or pathology results from your procedure in your [facility]   account before your physician is able to contact you. Your physician or   their representative will relay the results to you with their   recommendations at their soonest availability.  Thank you,  RESTRICTIONS:  During your procedure today, you received medications for sedation.  These   medications may affect your judgment, balance and coordination.  Therefore,   for 24 hours, you have the following restrictions:   - DO NOT drive a car, operate machinery, make legal/financial decisions,   sign important papers or drink alcohol.    ACTIVITY:  Today: no heavy lifting, straining or running due to procedural   sedation/anesthesia.  The following day: return to full activity including work.  DIET:  Eat and drink normally unless instructed otherwise.     TREATMENT FOR COMMON SIDE EFFECTS:  - Mild abdominal pain, nausea, belching, bloating or excessive gas:  rest,   eat lightly and use a heating pad.  - Sore Throat: treat with throat lozenges and/or gargle with warm salt   water.  - Because air was used during the procedure, expelling large amounts of air   from your rectum or belching is normal.  - If a bowel prep was taken, you may not have a bowel movement for 1-3 days.    This is normal.  SYMPTOMS TO WATCH FOR AND REPORT TO YOUR PHYSICIAN:  1. Abdominal pain or bloating, other than gas cramps.  2. Chest pain.  3. Back pain.  4. Signs of infection such as: chills or fever occurring within 24 hours   after the procedure.  5. Rectal bleeding, which would show as bright red, maroon, or black stools.   (A tablespoon of blood from the rectum is not serious, especially if    hemorrhoids are present.)  6. Vomiting.  7. Weakness or dizziness.  GO DIRECTLY TO THE NEAREST EMERGENCY ROOM IF YOU HAVE ANY OF THE FOLLOWING:      Difficulty breathing              Chills and/or fever over 101 F   Persistent vomiting and/or vomiting blood   Severe abdominal pain   Severe chest pain   Black, tarry stools   Bleeding- more than one tablespoon   Any other symptom or condition that you feel may need urgent attention  Your doctor recommends these additional instructions:  If any biopsies were taken, your doctors clinic will contact you in 1 to 2   weeks with any results.  - Patient has a contact number available for emergencies.  The signs and   symptoms of potential delayed complications were discussed with the   patient.  Return to normal activities tomorrow.  Written discharge   instructions were provided to the patient.   - High fiber diet.   - Continue present medications.   - Await pathology results.   - Repeat colonoscopy in 3 years for surveillance.   - No aspirin, ibuprofen, naproxen, or other non-steroidal anti-inflammatory   drugs for 2 weeks after polyp removal.   - Discharge patient to home (ambulatory).   - Return to my office PRN.  For questions, problems or results please call your physician - Brian Morel MD at Work:  [phone].  [facility], EMERGENCY ROOM PHONE NUMBER: [phone]  IF A COMPLICATION OR EMERGENCY SITUATION ARISES AND YOU ARE UNABLE TO REACH   YOUR PHYSICIAN - GO DIRECTLY TO THE EMERGENCY ROOM.  Brian Morel MD  4/11/2022 9:14:26 AM  This report has been verified and signed electronically.  Dear patient,  As a result of recent federal legislation (The Federal Cures Act), you may   receive lab or pathology results from your procedure in your [facility]   account before your physician is able to contact you. Your physician or   their representative will relay the results to you with their   recommendations at their soonest availability.  Thank  you,  PROVATION

## 2022-04-22 ENCOUNTER — OFFICE VISIT (OUTPATIENT)
Dept: FAMILY MEDICINE | Facility: CLINIC | Age: 52
End: 2022-04-22
Payer: MEDICAID

## 2022-04-22 VITALS
DIASTOLIC BLOOD PRESSURE: 72 MMHG | SYSTOLIC BLOOD PRESSURE: 136 MMHG | HEART RATE: 79 BPM | HEIGHT: 66 IN | BODY MASS INDEX: 31.85 KG/M2 | WEIGHT: 198.19 LBS | OXYGEN SATURATION: 97 %

## 2022-04-22 DIAGNOSIS — M62.838 MUSCLE SPASM: ICD-10-CM

## 2022-04-22 DIAGNOSIS — Z51.81 ENCOUNTER FOR THERAPEUTIC DRUG MONITORING: ICD-10-CM

## 2022-04-22 DIAGNOSIS — Z79.899 ENCOUNTER FOR LONG-TERM (CURRENT) USE OF OTHER MEDICATIONS: ICD-10-CM

## 2022-04-22 DIAGNOSIS — F51.01 PRIMARY INSOMNIA: Primary | ICD-10-CM

## 2022-04-22 DIAGNOSIS — F41.9 ANXIETY: ICD-10-CM

## 2022-04-22 LAB
FINAL PATHOLOGIC DIAGNOSIS: NORMAL
GROSS: NORMAL
Lab: NORMAL

## 2022-04-22 PROCEDURE — 99213 OFFICE O/P EST LOW 20 MIN: CPT | Mod: S$GLB,,, | Performed by: PHYSICIAN ASSISTANT

## 2022-04-22 PROCEDURE — 3008F PR BODY MASS INDEX (BMI) DOCUMENTED: ICD-10-PCS | Mod: S$GLB,,, | Performed by: PHYSICIAN ASSISTANT

## 2022-04-22 PROCEDURE — 99213 PR OFFICE/OUTPT VISIT, EST, LEVL III, 20-29 MIN: ICD-10-PCS | Mod: S$GLB,,, | Performed by: PHYSICIAN ASSISTANT

## 2022-04-22 PROCEDURE — 3008F BODY MASS INDEX DOCD: CPT | Mod: S$GLB,,, | Performed by: PHYSICIAN ASSISTANT

## 2022-04-22 PROCEDURE — 3078F DIAST BP <80 MM HG: CPT | Mod: S$GLB,,, | Performed by: PHYSICIAN ASSISTANT

## 2022-04-22 PROCEDURE — 3075F PR MOST RECENT SYSTOLIC BLOOD PRESS GE 130-139MM HG: ICD-10-PCS | Mod: S$GLB,,, | Performed by: PHYSICIAN ASSISTANT

## 2022-04-22 PROCEDURE — 3075F SYST BP GE 130 - 139MM HG: CPT | Mod: S$GLB,,, | Performed by: PHYSICIAN ASSISTANT

## 2022-04-22 PROCEDURE — 3078F PR MOST RECENT DIASTOLIC BLOOD PRESSURE < 80 MM HG: ICD-10-PCS | Mod: S$GLB,,, | Performed by: PHYSICIAN ASSISTANT

## 2022-04-22 RX ORDER — LORAZEPAM 0.5 MG/1
0.5 TABLET ORAL NIGHTLY
Qty: 30 TABLET | Refills: 5 | Status: SHIPPED | OUTPATIENT
Start: 2022-04-22 | End: 2022-12-16 | Stop reason: SDUPTHER

## 2022-04-22 RX ORDER — CYCLOBENZAPRINE HCL 10 MG
10 TABLET ORAL 3 TIMES DAILY PRN
Qty: 30 TABLET | Refills: 2 | Status: SHIPPED | OUTPATIENT
Start: 2022-04-22 | End: 2022-06-21

## 2022-04-22 NOTE — PROGRESS NOTES
SUBJECTIVE:    Patient ID: Sakshi Plummer is a 52 y.o. female.    Chief Complaint: Follow-up (No bottles//Pt is her for a 2 month check and medication refills//Last taken lorazepam was last night//Pt c/o lower left leg spasming mostly.//MAMADOU)    This is a 52-year-old female who presents today for 2 month checkup.  She was started on lorazepam at night after failing multiple other medications for insomnia and restless leg.  She also maintains gabapentin twice a day.  She is due to have blood work updated.  Recently had colonoscopy with Dr. Clements. She has been having more anxiety of late. Family and work stress. Currently she is exploring the possibility of a new job. She is struggling with continued left lower leg muscle spasming . Thinks that she may benefit from muscle spasm. She had labs completed and they are scanned into the chart from outside source.      No visits with results within 6 Month(s) from this visit.   Latest known visit with results is:   No results found for any previous visit.       Past Medical History:   Diagnosis Date    Anemia     Anxiety     Arthritis     Depression     Encounter for blood transfusion 2009    iron deficency    Osteochondroma of right tibia      Past Surgical History:   Procedure Laterality Date     SECTION      COLONOSCOPY N/A 2022    Procedure: COLONOSCOPY;  Surgeon: Brian Clements MD;  [facility];  Service: Endoscopy;  Laterality: N/A;    COSMETIC SURGERY Bilateral     Breast implants    GASTRIC BYPASS      HYSTERECTOMY      KNEE SURGERY Right     OsteoChondroma removed     Family History   Problem Relation Age of Onset    Diabetes Mother     No Known Problems Father        Marital Status: Single  Alcohol History:  reports current alcohol use.  Tobacco History:  reports that she has never smoked. She has never used smokeless tobacco.  Drug History:  reports no history of drug use.    Review of patient's  "allergies indicates:   Allergen Reactions    Codeine Other (See Comments)     nightmares    Trazodone Other (See Comments)     nightmares       Current Outpatient Medications:     cetirizine (ZYRTEC) 10 MG tablet, Take 10 mg by mouth once daily., Disp: , Rfl:     cyclobenzaprine (FLEXERIL) 10 MG tablet, Take 1 tablet (10 mg total) by mouth 3 (three) times daily as needed for Muscle spasms., Disp: 30 tablet, Rfl: 2    gabapentin (NEURONTIN) 100 MG capsule, Take 100 mg by mouth 2 (two) times daily., Disp: , Rfl:     LORazepam (ATIVAN) 0.5 MG tablet, Take 1 tablet (0.5 mg total) by mouth every evening., Disp: 30 tablet, Rfl: 5    Review of Systems   Constitutional: Negative for activity change, fatigue, fever and unexpected weight change.   HENT: Negative for congestion.    Respiratory: Negative for apnea, cough, chest tightness and shortness of breath.    Cardiovascular: Negative for chest pain and palpitations.   Gastrointestinal: Negative for abdominal distention and abdominal pain.   Genitourinary: Negative for difficulty urinating and dyspareunia.   Musculoskeletal: Negative for arthralgias and back pain.        Chronic L foot pain   Neurological: Negative for dizziness and weakness.   Psychiatric/Behavioral: Positive for sleep disturbance. The patient is nervous/anxious.           Objective:      Vitals:    04/22/22 0805   BP: 136/72   Pulse: 79   SpO2: 97%   Weight: 89.9 kg (198 lb 3.2 oz)   Height: 5' 6" (1.676 m)     Physical Exam  Constitutional:       General: She is not in acute distress.     Appearance: She is well-developed.   HENT:      Head: Normocephalic and atraumatic.   Eyes:      Conjunctiva/sclera: Conjunctivae normal.      Pupils: Pupils are equal, round, and reactive to light.   Neck:      Thyroid: No thyromegaly.   Cardiovascular:      Rate and Rhythm: Normal rate and regular rhythm.      Heart sounds: Normal heart sounds.   Pulmonary:      Effort: Pulmonary effort is normal.      Breath " sounds: Normal breath sounds.   Abdominal:      General: Bowel sounds are normal. There is no distension.      Palpations: Abdomen is soft.      Tenderness: There is no abdominal tenderness.   Musculoskeletal:         General: Normal range of motion.      Cervical back: Normal range of motion and neck supple.   Skin:     General: Skin is warm and dry.      Findings: No erythema.   Neurological:      Mental Status: She is alert and oriented to person, place, and time.      Cranial Nerves: No cranial nerve deficit.           Assessment:       1. Primary insomnia    2. Anxiety    3. Encounter for therapeutic drug monitoring    4. Encounter for long-term (current) use of other medications    5. Muscle spasm         Plan:       Primary insomnia  Comments:  She is doing well with the ativan prn at night. refills as needed.  Orders:  -     LORazepam (ATIVAN) 0.5 MG tablet; Take 1 tablet (0.5 mg total) by mouth every evening.  Dispense: 30 tablet; Refill: 5    Anxiety  -     DRUG MONITOR, PANEL 4, W/CONF, URINE    Encounter for therapeutic drug monitoring  -     DRUG MONITOR, PANEL 4, W/CONF, URINE    Encounter for long-term (current) use of other medications  -     DRUG MONITOR, PANEL 4, W/CONF, URINE    Muscle spasm  Comments:  trial with flexeril now for the muscle spasms.  Orders:  -     cyclobenzaprine (FLEXERIL) 10 MG tablet; Take 1 tablet (10 mg total) by mouth 3 (three) times daily as needed for Muscle spasms.  Dispense: 30 tablet; Refill: 2      Follow up in about 3 months (around 7/22/2022).        4/22/2022 Luis Alberto Guzman PA-C

## 2022-04-26 NOTE — PROGRESS NOTES
Please advise patient that polyp pathology was reviewed and is benign and is the adenomatous and sessile serrated type, one of which had low grade dysplasia which is definitely precancerous/risk factor for cancer.  Repeat colonoscopy recommended in no more than 3 years.  Place reminder in system for repeat colonoscopy.

## 2022-05-03 ENCOUNTER — PATIENT MESSAGE (OUTPATIENT)
Dept: FAMILY MEDICINE | Facility: CLINIC | Age: 52
End: 2022-05-03

## 2022-05-04 ENCOUNTER — PATIENT MESSAGE (OUTPATIENT)
Dept: FAMILY MEDICINE | Facility: CLINIC | Age: 52
End: 2022-05-04

## 2022-06-09 ENCOUNTER — TELEPHONE (OUTPATIENT)
Dept: FAMILY MEDICINE | Facility: CLINIC | Age: 52
End: 2022-06-09

## 2022-06-09 NOTE — TELEPHONE ENCOUNTER
Pt stated she is having abd pain and headaches for past few months. Scheduled pt appt on 06/13/2022

## 2022-06-13 ENCOUNTER — OFFICE VISIT (OUTPATIENT)
Dept: FAMILY MEDICINE | Facility: CLINIC | Age: 52
End: 2022-06-13
Payer: MEDICAID

## 2022-06-13 VITALS
SYSTOLIC BLOOD PRESSURE: 110 MMHG | HEIGHT: 66 IN | HEART RATE: 76 BPM | BODY MASS INDEX: 31.18 KG/M2 | OXYGEN SATURATION: 97 % | DIASTOLIC BLOOD PRESSURE: 72 MMHG | WEIGHT: 194 LBS

## 2022-06-13 DIAGNOSIS — K42.9 UMBILICAL HERNIA WITHOUT OBSTRUCTION AND WITHOUT GANGRENE: ICD-10-CM

## 2022-06-13 DIAGNOSIS — G44.229 CHRONIC TENSION-TYPE HEADACHE, NOT INTRACTABLE: Primary | ICD-10-CM

## 2022-06-13 PROCEDURE — 3008F PR BODY MASS INDEX (BMI) DOCUMENTED: ICD-10-PCS | Mod: CPTII,S$GLB,, | Performed by: PHYSICIAN ASSISTANT

## 2022-06-13 PROCEDURE — 3078F DIAST BP <80 MM HG: CPT | Mod: CPTII,S$GLB,, | Performed by: PHYSICIAN ASSISTANT

## 2022-06-13 PROCEDURE — 1159F PR MEDICATION LIST DOCUMENTED IN MEDICAL RECORD: ICD-10-PCS | Mod: CPTII,S$GLB,, | Performed by: PHYSICIAN ASSISTANT

## 2022-06-13 PROCEDURE — 3074F SYST BP LT 130 MM HG: CPT | Mod: CPTII,S$GLB,, | Performed by: PHYSICIAN ASSISTANT

## 2022-06-13 PROCEDURE — 99214 OFFICE O/P EST MOD 30 MIN: CPT | Mod: S$GLB,,, | Performed by: PHYSICIAN ASSISTANT

## 2022-06-13 PROCEDURE — 1159F MED LIST DOCD IN RCRD: CPT | Mod: CPTII,S$GLB,, | Performed by: PHYSICIAN ASSISTANT

## 2022-06-13 PROCEDURE — 3008F BODY MASS INDEX DOCD: CPT | Mod: CPTII,S$GLB,, | Performed by: PHYSICIAN ASSISTANT

## 2022-06-13 PROCEDURE — 99214 PR OFFICE/OUTPT VISIT, EST, LEVL IV, 30-39 MIN: ICD-10-PCS | Mod: S$GLB,,, | Performed by: PHYSICIAN ASSISTANT

## 2022-06-13 PROCEDURE — 3078F PR MOST RECENT DIASTOLIC BLOOD PRESSURE < 80 MM HG: ICD-10-PCS | Mod: CPTII,S$GLB,, | Performed by: PHYSICIAN ASSISTANT

## 2022-06-13 PROCEDURE — 3074F PR MOST RECENT SYSTOLIC BLOOD PRESSURE < 130 MM HG: ICD-10-PCS | Mod: CPTII,S$GLB,, | Performed by: PHYSICIAN ASSISTANT

## 2022-06-13 RX ORDER — BUTALBITAL, ASPIRIN, AND CAFFEINE 325; 50; 40 MG/1; MG/1; MG/1
1 CAPSULE ORAL EVERY 4 HOURS PRN
Qty: 60 CAPSULE | Refills: 0 | Status: SHIPPED | OUTPATIENT
Start: 2022-06-13 | End: 2022-07-13

## 2022-06-13 NOTE — PROGRESS NOTES
"  SUBJECTIVE:    Patient ID: Sakshi Plummer is a 52 y.o. female.    Chief Complaint: Headache (Chronic headaches and abdominal pain//patient complains of sore throat//no med bottles//tc)    This is a 52-year-old female who presents today for evaluation of headache.  Reports chronic nature of these. Used to be surrounding an acute illness for her every time the headaches occur. Now she is noticing that the headaches tend to be more daily. Tension type, Vice  around her head. Some stress. Tends to respond to excedrin pretty well in the past but less so now.. She denies any other neurological deficits. Other concerns of hernia to her umbilical area. More painful for her now on a daily basis.      Admission on 04/11/2022, Discharged on 04/11/2022   Component Date Value Ref Range Status    Final Pathologic Diagnosis 04/11/2022    Final                    Value:1. Colon, ascending, polyps, biopsy:  - Tubular adenoma, single fragment.  - Hyperplastic polyp, multiple fragments.  2. Colon, transverse, polyp, biopsy:  - Sessile serrated lesion with low-grade cytologic dysplasia, see comment.  3. Colon, sigmoid, polyp, biopsy:  - Hyperplastic polyp.  COMMENT:  Part 2 this case is reviewed by Dr. ANNABELLE Bhagat who agrees with the  above diagnosis.      Gross 04/11/2022    Final                    Value:Number of containers:  3  Part 1  Container Label: Clinic Number/AP Number:  [id number], and "ascending colon  polyps"  Received in formalin in polyp trap 1 is a 9 x 6 x 2 mm aggregate of soft tan  polypoid tissue fragments.  Specimen is filtered, stained with Hematoxylin,  and entirely submitted in SLI--1-A.  Part 2  Container Label: Clinic Number/AP Number:  [id number], and "transverse colon  polyp"  Received in formalin is an 11 x 9 x 6 mm soft tan-brown polypoid tissue  fragment.  The base is inked blue.  Specimen is serially sectioned and  entirely submitted in MUV--2-A.  Part 3  Container Label: Clinic " "Number/AP Number:  [id number], and "sigmoid colon polyp"  Received in formalin is a 3 x 2 x 1 mm soft tan polypoid tissue fragment.  Entirely submitted in DJJ--3-A.  AUGUSTO Pearce.      Disclaimer 2022    Final                    Value:Unless the case is a 'gross only' or additional testing only, the final  diagnosis for each specimen is based on a microscopic examination of  appropriate tissue sections.         Past Medical History:   Diagnosis Date    Anemia     Anxiety     Arthritis     Depression     Encounter for blood transfusion 2009    iron deficency    Osteochondroma of right tibia      Past Surgical History:   Procedure Laterality Date     SECTION      COLONOSCOPY N/A 2022    Procedure: COLONOSCOPY;  Surgeon: Brian Clements MD;  Location: [facility];  Service: Endoscopy;  Laterality: N/A;    COSMETIC SURGERY Bilateral     Breast implants    GASTRIC BYPASS      HYSTERECTOMY      KNEE SURGERY Right     OsteoChondroma removed     Family History   Problem Relation Age of Onset    Diabetes Mother     No Known Problems Father        Marital Status: Single  Alcohol History:  reports current alcohol use.  Tobacco History:  reports that she has never smoked. She has never used smokeless tobacco.  Drug History:  reports no history of drug use.    Review of patient's allergies indicates:   Allergen Reactions    Codeine Other (See Comments)     nightmares    Trazodone Other (See Comments)     nightmares       Current Outpatient Medications:     butalbital-aspirin-caffeine -40 mg (FIORINAL) -40 mg Cap, Take 1 capsule by mouth every 4 (four) hours as needed (tension headache)., Disp: 60 capsule, Rfl: 0    cetirizine (ZYRTEC) 10 MG tablet, Take 10 mg by mouth once daily., Disp: , Rfl:     cyclobenzaprine (FLEXERIL) 10 MG tablet, Take 1 tablet (10 mg total) by mouth 3 (three) times daily as needed for Muscle spasms., Disp: 30 tablet, Rfl: 2    " "LORazepam (ATIVAN) 0.5 MG tablet, Take 1 tablet (0.5 mg total) by mouth every evening., Disp: 30 tablet, Rfl: 5    Review of Systems   Constitutional: Negative for appetite change, chills, fatigue, fever and unexpected weight change.   HENT: Negative for congestion.    Respiratory: Negative for cough, chest tightness and shortness of breath.    Cardiovascular: Negative for chest pain and palpitations.   Gastrointestinal: Negative for abdominal distention and abdominal pain.   Endocrine: Negative for cold intolerance and heat intolerance.   Genitourinary: Negative for difficulty urinating and dysuria.   Musculoskeletal: Negative for arthralgias and back pain.   Neurological: Positive for headaches. Negative for dizziness and weakness.          Objective:      Vitals:    06/13/22 0809   BP: 110/72   Pulse: 76   SpO2: 97%   Weight: 88 kg (194 lb)   Height: 5' 6" (1.676 m)     Physical Exam  Constitutional:       General: She is not in acute distress.     Appearance: She is well-developed.   HENT:      Head: Normocephalic and atraumatic.   Eyes:      Conjunctiva/sclera: Conjunctivae normal.      Pupils: Pupils are equal, round, and reactive to light.   Neck:      Thyroid: No thyromegaly.   Cardiovascular:      Rate and Rhythm: Normal rate and regular rhythm.      Heart sounds: Normal heart sounds.   Pulmonary:      Effort: Pulmonary effort is normal.      Breath sounds: Normal breath sounds.   Abdominal:      General: Bowel sounds are normal. There is no distension.      Palpations: Abdomen is soft.      Tenderness: There is no abdominal tenderness.      Hernia: A hernia (reducible umbilical hernia. TTP, no discoloration) is present.   Musculoskeletal:         General: Normal range of motion.      Cervical back: Normal range of motion and neck supple.   Skin:     General: Skin is warm and dry.      Findings: No erythema.   Neurological:      General: No focal deficit present.      Mental Status: She is alert and " oriented to person, place, and time. Mental status is at baseline.      Cranial Nerves: No cranial nerve deficit.      Motor: No weakness.           Assessment:       1. Chronic tension-type headache, not intractable    2. Umbilical hernia without obstruction and without gangrene         Plan:       Chronic tension-type headache, not intractable  Comments:  Trial with fiorinal for tension HA. NO worrisome findings on exam today. If no improvement will consider imaging/neuro workup  Orders:  -     butalbital-aspirin-caffeine -40 mg (FIORINAL) -40 mg Cap; Take 1 capsule by mouth every 4 (four) hours as needed (tension headache).  Dispense: 60 capsule; Refill: 0    Umbilical hernia without obstruction and without gangrene  Comments:  wishes to evaluate further with surgeon. Given daily pain, may warrant elective repair.  Orders:  -     Ambulatory referral/consult to General Surgery; Future; Expected date: 06/13/2022      Follow up if symptoms worsen or fail to improve, for as scheduled.        6/13/2022 Luis Alberto Guzman PA-C

## 2022-06-20 ENCOUNTER — PATIENT MESSAGE (OUTPATIENT)
Dept: FAMILY MEDICINE | Facility: CLINIC | Age: 52
End: 2022-06-20

## 2022-06-20 ENCOUNTER — TELEPHONE (OUTPATIENT)
Dept: SURGERY | Facility: CLINIC | Age: 52
End: 2022-06-20
Payer: MEDICAID

## 2022-06-20 ENCOUNTER — OFFICE VISIT (OUTPATIENT)
Dept: FAMILY MEDICINE | Facility: CLINIC | Age: 52
End: 2022-06-20
Payer: MEDICAID

## 2022-06-20 VITALS
TEMPERATURE: 98 F | HEIGHT: 66 IN | SYSTOLIC BLOOD PRESSURE: 118 MMHG | BODY MASS INDEX: 31.02 KG/M2 | WEIGHT: 193 LBS | HEART RATE: 72 BPM | DIASTOLIC BLOOD PRESSURE: 72 MMHG

## 2022-06-20 DIAGNOSIS — G44.229 CHRONIC TENSION-TYPE HEADACHE, NOT INTRACTABLE: ICD-10-CM

## 2022-06-20 DIAGNOSIS — F41.9 ANXIETY: ICD-10-CM

## 2022-06-20 DIAGNOSIS — J02.9 VIRAL PHARYNGITIS: Primary | ICD-10-CM

## 2022-06-20 DIAGNOSIS — F51.01 PRIMARY INSOMNIA: ICD-10-CM

## 2022-06-20 PROCEDURE — 3008F PR BODY MASS INDEX (BMI) DOCUMENTED: ICD-10-PCS | Mod: CPTII,S$GLB,, | Performed by: PHYSICIAN ASSISTANT

## 2022-06-20 PROCEDURE — 1160F PR REVIEW ALL MEDS BY PRESCRIBER/CLIN PHARMACIST DOCUMENTED: ICD-10-PCS | Mod: CPTII,S$GLB,, | Performed by: PHYSICIAN ASSISTANT

## 2022-06-20 PROCEDURE — 1159F PR MEDICATION LIST DOCUMENTED IN MEDICAL RECORD: ICD-10-PCS | Mod: CPTII,S$GLB,, | Performed by: PHYSICIAN ASSISTANT

## 2022-06-20 PROCEDURE — 3074F SYST BP LT 130 MM HG: CPT | Mod: CPTII,S$GLB,, | Performed by: PHYSICIAN ASSISTANT

## 2022-06-20 PROCEDURE — 99213 OFFICE O/P EST LOW 20 MIN: CPT | Mod: S$GLB,,, | Performed by: PHYSICIAN ASSISTANT

## 2022-06-20 PROCEDURE — 1160F RVW MEDS BY RX/DR IN RCRD: CPT | Mod: CPTII,S$GLB,, | Performed by: PHYSICIAN ASSISTANT

## 2022-06-20 PROCEDURE — 3074F PR MOST RECENT SYSTOLIC BLOOD PRESSURE < 130 MM HG: ICD-10-PCS | Mod: CPTII,S$GLB,, | Performed by: PHYSICIAN ASSISTANT

## 2022-06-20 PROCEDURE — 1159F MED LIST DOCD IN RCRD: CPT | Mod: CPTII,S$GLB,, | Performed by: PHYSICIAN ASSISTANT

## 2022-06-20 PROCEDURE — 3078F PR MOST RECENT DIASTOLIC BLOOD PRESSURE < 80 MM HG: ICD-10-PCS | Mod: CPTII,S$GLB,, | Performed by: PHYSICIAN ASSISTANT

## 2022-06-20 PROCEDURE — 3078F DIAST BP <80 MM HG: CPT | Mod: CPTII,S$GLB,, | Performed by: PHYSICIAN ASSISTANT

## 2022-06-20 PROCEDURE — 3008F BODY MASS INDEX DOCD: CPT | Mod: CPTII,S$GLB,, | Performed by: PHYSICIAN ASSISTANT

## 2022-06-20 PROCEDURE — 99213 PR OFFICE/OUTPT VISIT, EST, LEVL III, 20-29 MIN: ICD-10-PCS | Mod: S$GLB,,, | Performed by: PHYSICIAN ASSISTANT

## 2022-06-20 RX ORDER — DEXAMETHASONE SODIUM PHOSPHATE 4 MG/ML
4 INJECTION, SOLUTION INTRA-ARTICULAR; INTRALESIONAL; INTRAMUSCULAR; INTRAVENOUS; SOFT TISSUE
Status: COMPLETED | OUTPATIENT
Start: 2022-06-20 | End: 2022-06-20

## 2022-06-20 RX ORDER — GABAPENTIN 600 MG/1
600 TABLET ORAL 3 TIMES DAILY
COMMUNITY
Start: 2022-03-11 | End: 2022-06-20

## 2022-06-20 RX ADMIN — DEXAMETHASONE SODIUM PHOSPHATE 4 MG: 4 INJECTION, SOLUTION INTRA-ARTICULAR; INTRALESIONAL; INTRAMUSCULAR; INTRAVENOUS; SOFT TISSUE at 05:06

## 2022-06-20 NOTE — TELEPHONE ENCOUNTER
I do not have any notes on her sore throat. I see where it was mentioned in the CC to the nurse but she never actually brought it up to me. I always recommend otc medication, chlorseptic spray etc. Is she running a fever? Anybody else sick around her? Difficulty swallowing?

## 2022-06-20 NOTE — TELEPHONE ENCOUNTER
----- Message from Jose Bird sent at 6/20/2022 10:20 AM CDT -----  Contact: LINDA BRAUN [[id number]]  Type: Patient Call Back    Who called:LINDA BRAUN [[id number]]    What is the request in detail: The patient would like to schedule a appointment     Can the clinic reply by [facility]?    Would the patient rather a call back or a response via [facility]?     Best call back number: [phone] (mobile)    Additional Information:Umbilical hernia without obstruction and without gangrene [K42.9]

## 2022-06-20 NOTE — PROGRESS NOTES
"  SUBJECTIVE:    Patient ID: Sakshi Plummer is a 52 y.o. female.    Chief Complaint: Sore Throat (No bottles/ having constant headaches and sore throat, Fiorinal is helping/ lc)    Pt is a 52 y.o. female who presents today for sick visit with a CC of "a sore throat." This past Saturday, she experienced an acute onset of a sore throat.  The sore throat is rated as a 8/10 burning sensation that is constantly present.  Swallowing and phonating tends to exacerbate the pain.  To alleviate the pain, she has been taking OTC Tylenol and drinking cold water throughout the day.  She denies any fevers, SOB, coughing, or cervical lymphadenopathy.    Admission on 04/11/2022, Discharged on 04/11/2022   Component Date Value Ref Range Status    Final Pathologic Diagnosis 04/11/2022    Final                    Value:1. Colon, ascending, polyps, biopsy:  - Tubular adenoma, single fragment.  - Hyperplastic polyp, multiple fragments.  2. Colon, transverse, polyp, biopsy:  - Sessile serrated lesion with low-grade cytologic dysplasia, see comment.  3. Colon, sigmoid, polyp, biopsy:  - Hyperplastic polyp.  COMMENT:  Part 2 this case is reviewed by Dr. ANNABELLE Bhagat who agrees with the  above diagnosis.      Gross 04/11/2022    Final                    Value:Number of containers:  3  Part 1  Container Label: Clinic Number/AP Number:  [id number], and "ascending colon  polyps"  Received in formalin in polyp trap 1 is a 9 x 6 x 2 mm aggregate of soft tan  polypoid tissue fragments.  Specimen is filtered, stained with Hematoxylin,  and entirely submitted in JUS--1-A.  Part 2  Container Label: Clinic Number/AP Number:  [id number], and "transverse colon  polyp"  Received in formalin is an 11 x 9 x 6 mm soft tan-brown polypoid tissue  fragment.  The base is inked blue.  Specimen is serially sectioned and  entirely submitted in FJS--2-A.  Part 3  Container Label: Clinic Number/AP Number:  [id number], and "sigmoid colon polyp"  Received in " formalin is a 3 x 2 x 1 mm soft tan polypoid tissue fragment.  Entirely submitted in QEX--3-A.  AUGUSTO Pearce.      Disclaimer 2022    Final                    Value:Unless the case is a 'gross only' or additional testing only, the final  diagnosis for each specimen is based on a microscopic examination of  appropriate tissue sections.         Past Medical History:   Diagnosis Date    Anemia     Anxiety     Arthritis     Depression     Encounter for blood transfusion 2009    iron deficency    Osteochondroma of right tibia      Past Surgical History:   Procedure Laterality Date     SECTION      COLONOSCOPY N/A 2022    Procedure: COLONOSCOPY;  Surgeon: Brian Clements MD;  Location: [facility];  Service: Endoscopy;  Laterality: N/A;    COSMETIC SURGERY Bilateral     Breast implants    GASTRIC BYPASS      HYSTERECTOMY      KNEE SURGERY Right     OsteoChondroma removed     Family History   Problem Relation Age of Onset    Diabetes Mother     No Known Problems Father        Marital Status: Single  Alcohol History:  reports current alcohol use.  Tobacco History:  reports that she has never smoked. She has never used smokeless tobacco.  Drug History:  reports no history of drug use.    Health Maintenance Topics with due status: Not Due       Topic Last Completion Date    Mammogram 2021    Colorectal Cancer Screening 2022     Immunization History   Administered Date(s) Administered    COVID-19, MRNA, LN-S, PF (Pfizer) (Purple Cap) 08/10/2021, 2021    Influenza (FLUBLOK) - Quadrivalent - Recombinant - PF *Preferred* (egg allergy) 2021       Review of patient's allergies indicates:   Allergen Reactions    Codeine Other (See Comments)     nightmares    Trazodone Other (See Comments)     nightmares       Current Outpatient Medications:     butalbital-aspirin-caffeine -40 mg (FIORINAL) -40 mg Cap, Take 1 capsule by mouth every 4  "(four) hours as needed (tension headache)., Disp: 60 capsule, Rfl: 0    cetirizine (ZYRTEC) 10 MG tablet, Take 10 mg by mouth once daily., Disp: , Rfl:     cyclobenzaprine (FLEXERIL) 10 MG tablet, Take 1 tablet (10 mg total) by mouth 3 (three) times daily as needed for Muscle spasms., Disp: 30 tablet, Rfl: 2    LORazepam (ATIVAN) 0.5 MG tablet, Take 1 tablet (0.5 mg total) by mouth every evening., Disp: 30 tablet, Rfl: 5  No current facility-administered medications for this visit.    Review of Systems   Constitutional: Negative for activity change, chills, fatigue and fever.   HENT: Positive for sore throat. Negative for congestion, postnasal drip, rhinorrhea and trouble swallowing.    Respiratory: Negative for cough, shortness of breath and wheezing.    Cardiovascular: Negative for chest pain, palpitations and leg swelling.   Gastrointestinal: Negative for abdominal pain, constipation, diarrhea, nausea and vomiting.   Genitourinary: Negative for difficulty urinating.   Musculoskeletal: Negative for arthralgias and myalgias.   Neurological: Negative for dizziness, syncope and light-headedness.   Psychiatric/Behavioral: Negative for behavioral problems.          Objective:      Vitals:    06/20/22 1622   BP: 118/72   Pulse: 72   Temp: 98.3 °F (36.8 °C)   Weight: 87.5 kg (193 lb)   Height: 5' 6" (1.676 m)     Physical Exam  Vitals and nursing note reviewed.   Constitutional:       General: She is not in acute distress.     Appearance: Normal appearance. She is obese. She is not ill-appearing, toxic-appearing or diaphoretic.   HENT:      Head: Normocephalic and atraumatic.      Right Ear: Tympanic membrane, ear canal and external ear normal. There is no impacted cerumen.      Left Ear: Tympanic membrane, ear canal and external ear normal. There is no impacted cerumen.      Nose: Nose normal. No rhinorrhea.      Mouth/Throat:      Lips: Pink. No lesions.      Mouth: Mucous membranes are moist. No oral lesions.      " Dentition: Normal dentition. No gingival swelling or dental abscesses.      Tongue: No lesions.      Palate: No lesions.      Pharynx: Oropharynx is clear. Uvula midline. Posterior oropharyngeal erythema present. No pharyngeal swelling, oropharyngeal exudate or uvula swelling.      Tonsils: No tonsillar exudate or tonsillar abscesses.   Eyes:      General: No scleral icterus.     Extraocular Movements: Extraocular movements intact.      Conjunctiva/sclera: Conjunctivae normal.      Pupils: Pupils are equal, round, and reactive to light.   Neck:      Vascular: No carotid bruit.   Cardiovascular:      Rate and Rhythm: Normal rate and regular rhythm.      Pulses: Normal pulses.      Heart sounds: Normal heart sounds. No murmur heard.    No friction rub.   Pulmonary:      Effort: Pulmonary effort is normal. No respiratory distress.      Breath sounds: Normal breath sounds. No wheezing, rhonchi or rales.   Abdominal:      General: There is no distension.      Palpations: Abdomen is soft.      Tenderness: There is no abdominal tenderness. There is no guarding or rebound.   Musculoskeletal:      Cervical back: Normal range of motion and neck supple. No tenderness.      Right lower leg: No edema.      Left lower leg: No edema.   Lymphadenopathy:      Cervical: No cervical adenopathy.   Skin:     General: Skin is warm and dry.   Neurological:      Mental Status: She is alert. Mental status is at baseline.   Psychiatric:         Behavior: Behavior is cooperative.           Assessment:       1. Viral pharyngitis    2. Chronic tension-type headache, not intractable    3. Anxiety    4. Primary insomnia           Plan:       Viral pharyngitis  Decadron 4mg IM administered today.  Start Nasacort nasal spray q.d. x7 days  Continue Zyrtec 10mg q.h.s.  Start warm Sitz gargles and throat lozenges throughout the day.  Centor criteria not met and there is low suspicion for possible strep throat at this time.  Based on presentation and  physical exam findings, low suspicion for possible mononucleosis at this time.  If no improvement in symptoms, contact the office.  -     dexamethasone injection 4 mg    Chronic tension-type headache, not intractable    Anxiety    Primary insomnia      Follow up if symptoms worsen or fail to improve, for Viral Pharyngitis.        6/20/2022 Erick Rockwell PA-C

## 2022-06-20 NOTE — TELEPHONE ENCOUNTER
Spoke with pt , He stated no fever , nobody is sick around her. She is having difficulty swallowing and losing voice. Scheduled pt an appt on tomorrow with Erick.

## 2022-06-23 ENCOUNTER — OFFICE VISIT (OUTPATIENT)
Dept: SURGERY | Facility: CLINIC | Age: 52
End: 2022-06-23
Payer: MEDICAID

## 2022-06-23 VITALS
BODY MASS INDEX: 31 KG/M2 | HEART RATE: 97 BPM | TEMPERATURE: 99 F | DIASTOLIC BLOOD PRESSURE: 83 MMHG | RESPIRATION RATE: 16 BRPM | HEIGHT: 66 IN | WEIGHT: 192.88 LBS | SYSTOLIC BLOOD PRESSURE: 141 MMHG

## 2022-06-23 DIAGNOSIS — K42.9 UMBILICAL HERNIA WITHOUT OBSTRUCTION AND WITHOUT GANGRENE: Primary | ICD-10-CM

## 2022-06-23 PROCEDURE — 3079F PR MOST RECENT DIASTOLIC BLOOD PRESSURE 80-89 MM HG: ICD-10-PCS | Mod: CPTII,,, | Performed by: STUDENT IN AN ORGANIZED HEALTH CARE EDUCATION/TRAINING PROGRAM

## 2022-06-23 PROCEDURE — 99203 OFFICE O/P NEW LOW 30 MIN: CPT | Mod: S$PBB,,, | Performed by: STUDENT IN AN ORGANIZED HEALTH CARE EDUCATION/TRAINING PROGRAM

## 2022-06-23 PROCEDURE — 1159F MED LIST DOCD IN RCRD: CPT | Mod: CPTII,,, | Performed by: STUDENT IN AN ORGANIZED HEALTH CARE EDUCATION/TRAINING PROGRAM

## 2022-06-23 PROCEDURE — 3077F SYST BP >= 140 MM HG: CPT | Mod: CPTII,,, | Performed by: STUDENT IN AN ORGANIZED HEALTH CARE EDUCATION/TRAINING PROGRAM

## 2022-06-23 PROCEDURE — 3077F PR MOST RECENT SYSTOLIC BLOOD PRESSURE >= 140 MM HG: ICD-10-PCS | Mod: CPTII,,, | Performed by: STUDENT IN AN ORGANIZED HEALTH CARE EDUCATION/TRAINING PROGRAM

## 2022-06-23 PROCEDURE — 3008F PR BODY MASS INDEX (BMI) DOCUMENTED: ICD-10-PCS | Mod: CPTII,,, | Performed by: STUDENT IN AN ORGANIZED HEALTH CARE EDUCATION/TRAINING PROGRAM

## 2022-06-23 PROCEDURE — 99999 PR PBB SHADOW E&M-EST. PATIENT-LVL III: CPT | Mod: PBBFAC,,, | Performed by: STUDENT IN AN ORGANIZED HEALTH CARE EDUCATION/TRAINING PROGRAM

## 2022-06-23 PROCEDURE — 3008F BODY MASS INDEX DOCD: CPT | Mod: CPTII,,, | Performed by: STUDENT IN AN ORGANIZED HEALTH CARE EDUCATION/TRAINING PROGRAM

## 2022-06-23 PROCEDURE — 99213 OFFICE O/P EST LOW 20 MIN: CPT | Mod: PBBFAC,PN | Performed by: STUDENT IN AN ORGANIZED HEALTH CARE EDUCATION/TRAINING PROGRAM

## 2022-06-23 PROCEDURE — 3079F DIAST BP 80-89 MM HG: CPT | Mod: CPTII,,, | Performed by: STUDENT IN AN ORGANIZED HEALTH CARE EDUCATION/TRAINING PROGRAM

## 2022-06-23 PROCEDURE — 99999 PR PBB SHADOW E&M-EST. PATIENT-LVL III: ICD-10-PCS | Mod: PBBFAC,,, | Performed by: STUDENT IN AN ORGANIZED HEALTH CARE EDUCATION/TRAINING PROGRAM

## 2022-06-23 PROCEDURE — 99203 PR OFFICE/OUTPT VISIT, NEW, LEVL III, 30-44 MIN: ICD-10-PCS | Mod: S$PBB,,, | Performed by: STUDENT IN AN ORGANIZED HEALTH CARE EDUCATION/TRAINING PROGRAM

## 2022-06-23 PROCEDURE — 1159F PR MEDICATION LIST DOCUMENTED IN MEDICAL RECORD: ICD-10-PCS | Mod: CPTII,,, | Performed by: STUDENT IN AN ORGANIZED HEALTH CARE EDUCATION/TRAINING PROGRAM

## 2022-06-23 RX ORDER — CYCLOBENZAPRINE HCL 10 MG
10 TABLET ORAL 3 TIMES DAILY PRN
COMMUNITY
End: 2023-08-21 | Stop reason: SDUPTHER

## 2022-06-23 RX ORDER — TRIAMCINOLONE ACETONIDE 55 UG/1
2 SPRAY, METERED NASAL DAILY
COMMUNITY
End: 2023-08-21

## 2022-06-23 NOTE — PROGRESS NOTES
History & Physical    SUBJECTIVE:     History of Present Illness:  Patient is a 52 y.o. female presents with periumbilical pain.  She 1st noticed an umbilical bulge many years ago.  Recently, it started becoming more symptomatic.  She has pain with movement in the periumbilical region that radiates more to the right side.  Patient has had a prior abdominal plasty.    No chief complaint on file.      Review of patient's allergies indicates:   Allergen Reactions    Codeine Other (See Comments)     nightmares    Trazodone Other (See Comments)     nightmares       Current Outpatient Medications   Medication Sig Dispense Refill    butalbital-aspirin-caffeine -40 mg (FIORINAL) -40 mg Cap Take 1 capsule by mouth every 4 (four) hours as needed (tension headache). 60 capsule 0    cetirizine (ZYRTEC) 10 MG tablet Take 10 mg by mouth once daily.      cyclobenzaprine (FLEXERIL) 10 MG tablet Take 10 mg by mouth 3 (three) times daily as needed for Muscle spasms.      LORazepam (ATIVAN) 0.5 MG tablet Take 1 tablet (0.5 mg total) by mouth every evening. 30 tablet 5    triamcinolone (NASACORT) 55 mcg nasal inhaler 2 sprays by Nasal route once daily.       No current facility-administered medications for this visit.       Past Medical History:   Diagnosis Date    Anemia     Anxiety     Arthritis     Depression     Encounter for blood transfusion 2009    iron deficency    Osteochondroma of right tibia      Past Surgical History:   Procedure Laterality Date     SECTION      COLONOSCOPY N/A 2022    Procedure: COLONOSCOPY;  Surgeon: Brian Clements MD;  Location: [facility];  Service: Endoscopy;  Laterality: N/A;    COSMETIC SURGERY Bilateral     Breast implants    GASTRIC BYPASS  2003    HYSTERECTOMY      KNEE SURGERY Right     OsteoChondroma removed     Family History   Problem Relation Age of Onset    Diabetes Mother     No Known Problems Father      Social History  "    Tobacco Use    Smoking status: Never Smoker    Smokeless tobacco: Never Used   Substance Use Topics    Alcohol use: Yes     Comment: socially    Drug use: No        Review of Systems:  Review of Systems   Constitutional: Negative for fever.   HENT: Negative.    Eyes: Negative.    Respiratory: Negative.    Cardiovascular: Negative.    Gastrointestinal: Positive for abdominal pain.   Endocrine: Negative.    Genitourinary: Negative.    Musculoskeletal: Negative.    Skin: Negative.    Allergic/Immunologic: Negative.    Neurological: Negative.    Hematological: Negative.    Psychiatric/Behavioral: Negative.        OBJECTIVE:     Vital Signs (Most Recent)  Temp: 99.4 °F (37.4 °C) (06/23/22 1548)  Pulse: 97 (06/23/22 1548)  Resp: 16 (06/23/22 1548)  BP: (!) 141/83 (06/23/22 1548)  5' 6" (1.676 m)  87.5 kg (192 lb 14.4 oz)     Physical Exam:  Physical Exam  Constitutional:       General: She is not in acute distress.     Appearance: Normal appearance. She is not ill-appearing, toxic-appearing or diaphoretic.   HENT:      Head: Normocephalic.      Nose: Nose normal.   Eyes:      Conjunctiva/sclera: Conjunctivae normal.   Cardiovascular:      Rate and Rhythm: Normal rate and regular rhythm.   Pulmonary:      Effort: Pulmonary effort is normal.   Abdominal:      Palpations: Abdomen is soft.      Comments: Possible umbilical defect.  There is palpable scar were prior operations that is painful.   Musculoskeletal:         General: Normal range of motion.      Cervical back: Normal range of motion.   Skin:     General: Skin is warm.   Neurological:      General: No focal deficit present.      Mental Status: She is alert.   Psychiatric:         Mood and Affect: Mood normal.         ASSESSMENT/PLAN:     52-year-old female that describes a bulging mass at her umbilicus.  This is becoming more symptomatic with pain.  Pain is worse with movement.    PLAN:  Given prior abdominal plasty, recommended CT for further evaluation and " surgical planning.  Return to clinic after imaging to discuss results

## 2022-06-29 ENCOUNTER — HOSPITAL ENCOUNTER (OUTPATIENT)
Dept: RADIOLOGY | Facility: HOSPITAL | Age: 52
Discharge: HOME | End: 2022-06-29
Attending: STUDENT IN AN ORGANIZED HEALTH CARE EDUCATION/TRAINING PROGRAM
Payer: MEDICAID

## 2022-06-29 DIAGNOSIS — K42.9 UMBILICAL HERNIA WITHOUT OBSTRUCTION AND WITHOUT GANGRENE: ICD-10-CM

## 2022-06-29 PROCEDURE — 25500020 PHARM REV CODE 255: Performed by: STUDENT IN AN ORGANIZED HEALTH CARE EDUCATION/TRAINING PROGRAM

## 2022-06-29 PROCEDURE — 74176 CT ABD & PELVIS W/O CONTRAST: CPT | Mod: 26,,, | Performed by: RADIOLOGY

## 2022-06-29 PROCEDURE — 74176 CT ABD & PELVIS W/O CONTRAST: CPT | Mod: TC

## 2022-06-29 PROCEDURE — 74176 CT ABDOMEN PELVIS WITHOUT CONTRAST: ICD-10-PCS | Mod: 26,,, | Performed by: RADIOLOGY

## 2022-06-29 PROCEDURE — A9698 NON-RAD CONTRAST MATERIALNOC: HCPCS | Performed by: STUDENT IN AN ORGANIZED HEALTH CARE EDUCATION/TRAINING PROGRAM

## 2022-06-29 RX ADMIN — BARIUM SULFATE 450 ML: 20 SUSPENSION ORAL at 04:06

## 2022-07-06 ENCOUNTER — PATIENT MESSAGE (OUTPATIENT)
Dept: SURGERY | Facility: CLINIC | Age: 52
End: 2022-07-06
Payer: MEDICAID

## 2022-07-06 ENCOUNTER — PATIENT MESSAGE (OUTPATIENT)
Dept: FAMILY MEDICINE | Facility: CLINIC | Age: 52
End: 2022-07-06

## 2022-07-07 NOTE — TELEPHONE ENCOUNTER
Spoke with patient, she stated she is not coming to the office for an appt again. This would be her third appt .She can not take off work.

## 2022-07-08 ENCOUNTER — OFFICE VISIT (OUTPATIENT)
Dept: FAMILY MEDICINE | Facility: CLINIC | Age: 52
End: 2022-07-08
Payer: COMMERCIAL

## 2022-07-08 VITALS — DIASTOLIC BLOOD PRESSURE: 84 MMHG | OXYGEN SATURATION: 98 % | SYSTOLIC BLOOD PRESSURE: 136 MMHG | HEART RATE: 78 BPM

## 2022-07-08 DIAGNOSIS — K21.9 GASTROESOPHAGEAL REFLUX DISEASE, UNSPECIFIED WHETHER ESOPHAGITIS PRESENT: ICD-10-CM

## 2022-07-08 DIAGNOSIS — R07.9 CHEST PAIN, UNSPECIFIED TYPE: Primary | ICD-10-CM

## 2022-07-08 DIAGNOSIS — T75.3XXA MOTION SICKNESS, INITIAL ENCOUNTER: ICD-10-CM

## 2022-07-08 LAB
EKG 12-LEAD: NORMAL
PR INTERVAL: NORMAL
PRT AXES: NORMAL
QRS DURATION: NORMAL
QT/QTC: NORMAL
VENTRICULAR RATE: NORMAL

## 2022-07-08 PROCEDURE — 1159F MED LIST DOCD IN RCRD: CPT | Mod: CPTII,S$GLB,, | Performed by: PHYSICIAN ASSISTANT

## 2022-07-08 PROCEDURE — 1159F PR MEDICATION LIST DOCUMENTED IN MEDICAL RECORD: ICD-10-PCS | Mod: CPTII,S$GLB,, | Performed by: PHYSICIAN ASSISTANT

## 2022-07-08 PROCEDURE — 99214 OFFICE O/P EST MOD 30 MIN: CPT | Mod: 25,S$GLB,, | Performed by: PHYSICIAN ASSISTANT

## 2022-07-08 PROCEDURE — 93000 POCT EKG 12-LEAD: ICD-10-PCS | Mod: S$GLB,,, | Performed by: PHYSICIAN ASSISTANT

## 2022-07-08 PROCEDURE — 99214 PR OFFICE/OUTPT VISIT, EST, LEVL IV, 30-39 MIN: ICD-10-PCS | Mod: 25,S$GLB,, | Performed by: PHYSICIAN ASSISTANT

## 2022-07-08 PROCEDURE — 93000 ELECTROCARDIOGRAM COMPLETE: CPT | Mod: S$GLB,,, | Performed by: PHYSICIAN ASSISTANT

## 2022-07-08 RX ORDER — PANTOPRAZOLE SODIUM 40 MG/1
40 TABLET, DELAYED RELEASE ORAL DAILY
Qty: 30 TABLET | Refills: 2 | Status: SHIPPED | OUTPATIENT
Start: 2022-07-08 | End: 2023-08-21

## 2022-07-08 RX ORDER — SCOLOPAMINE TRANSDERMAL SYSTEM 1 MG/1
1 PATCH, EXTENDED RELEASE TRANSDERMAL
Qty: 4 PATCH | Refills: 0 | Status: SHIPPED | OUTPATIENT
Start: 2022-07-08 | End: 2022-07-20

## 2022-07-08 NOTE — PROGRESS NOTES
"  SUBJECTIVE:    Patient ID: Sakshi Plummer is a 52 y.o. female.    Chief Complaint: Chest Pain (No bottles//Pt c/o constant mid breast bone pain since last night. Pt state the pain started after eating dinner. Hx of gastro bypass surgery.//MAMADOU)    This is a 52-year-old female who presents today for acute onset chest pain last night.  Started shortly after eating dinner and has persisted through until this morning. She has a prior hx of osiel en y procedure 2003. 19 years ago. Has done well to keep 150lbs off altogether. Last scope was about 5 years ago at [facility]. Dr. Mena. Lots of belching and gas associated. Reports that left ear pain has improved in the past day. Drying with alcohol seems to have helped. Otc pepto has not helped much. EKG stable.      Admission on 04/11/2022, Discharged on 04/11/2022   Component Date Value Ref Range Status    Final Pathologic Diagnosis 04/11/2022    Final                    Value:1. Colon, ascending, polyps, biopsy:  - Tubular adenoma, single fragment.  - Hyperplastic polyp, multiple fragments.  2. Colon, transverse, polyp, biopsy:  - Sessile serrated lesion with low-grade cytologic dysplasia, see comment.  3. Colon, sigmoid, polyp, biopsy:  - Hyperplastic polyp.  COMMENT:  Part 2 this case is reviewed by Dr. ANNABELLE Bhagat who agrees with the  above diagnosis.      Gross 04/11/2022    Final                    Value:Number of containers:  3  Part 1  Container Label: Clinic Number/AP Number:  [id number], and "ascending colon  polyps"  Received in formalin in polyp trap 1 is a 9 x 6 x 2 mm aggregate of soft tan  polypoid tissue fragments.  Specimen is filtered, stained with Hematoxylin,  and entirely submitted in WTG--1-A.  Part 2  Container Label: Clinic Number/AP Number:  [id number], and "transverse colon  polyp"  Received in formalin is an 11 x 9 x 6 mm soft tan-brown polypoid tissue  fragment.  The base is inked blue.  Specimen is serially sectioned " "and  entirely submitted in RBC--2-A.  Part 3  Container Label: Clinic Number/AP Number:  [id number], and "sigmoid colon polyp"  Received in formalin is a 3 x 2 x 1 mm soft tan polypoid tissue fragment.  Entirely submitted in RWX--3-A.  AUGUSTO Pearce.      Disclaimer 2022    Final                    Value:Unless the case is a 'gross only' or additional testing only, the final  diagnosis for each specimen is based on a microscopic examination of  appropriate tissue sections.         Past Medical History:   Diagnosis Date    Anemia     Anxiety     Arthritis     Depression     Encounter for blood transfusion 2009    iron deficency    Osteochondroma of right tibia      Past Surgical History:   Procedure Laterality Date     SECTION      COLONOSCOPY N/A 2022    Procedure: COLONOSCOPY;  Surgeon: Brian Clements MD;  Location: [facility];  Service: Endoscopy;  Laterality: N/A;    COSMETIC SURGERY Bilateral     Breast implants    GASTRIC BYPASS      HYSTERECTOMY      KNEE SURGERY Right     OsteoChondroma removed     Family History   Problem Relation Age of Onset    Diabetes Mother     No Known Problems Father        Marital Status: Single  Alcohol History:  reports current alcohol use.  Tobacco History:  reports that she has never smoked. She has never used smokeless tobacco.  Drug History:  reports no history of drug use.    Review of patient's allergies indicates:   Allergen Reactions    Codeine Other (See Comments)     nightmares    Trazodone Other (See Comments)     nightmares       Current Outpatient Medications:     butalbital-aspirin-caffeine -40 mg (FIORINAL) -40 mg Cap, Take 1 capsule by mouth every 4 (four) hours as needed (tension headache)., Disp: 60 capsule, Rfl: 0    cetirizine (ZYRTEC) 10 MG tablet, Take 10 mg by mouth once daily., Disp: , Rfl:     cyclobenzaprine (FLEXERIL) 10 MG tablet, Take 10 mg by mouth 3 (three) times daily " as needed for Muscle spasms., Disp: , Rfl:     LORazepam (ATIVAN) 0.5 MG tablet, Take 1 tablet (0.5 mg total) by mouth every evening., Disp: 30 tablet, Rfl: 5    pantoprazole (PROTONIX) 40 MG tablet, Take 1 tablet (40 mg total) by mouth once daily., Disp: 30 tablet, Rfl: 2    scopolamine (TRANSDERM-SCOP) 1.3-1.5 mg (1 mg over 3 days), Place 1 patch onto the skin every 72 hours. for 12 days, Disp: 4 patch, Rfl: 0    triamcinolone (NASACORT) 55 mcg nasal inhaler, 2 sprays by Nasal route once daily., Disp: , Rfl:     Review of Systems   Respiratory: Negative for shortness of breath.    Gastrointestinal: Positive for nausea. Negative for abdominal pain, blood in stool, diarrhea and vomiting.          Objective:      Vitals:    07/08/22 0806 07/08/22 0807 07/08/22 0848   BP: (!) 140/92 138/86 136/84   Pulse: (!) 117  78   SpO2: 98%       Physical Exam  Constitutional:       General: She is not in acute distress.     Appearance: She is well-developed.   HENT:      Head: Normocephalic and atraumatic.   Eyes:      Conjunctiva/sclera: Conjunctivae normal.      Pupils: Pupils are equal, round, and reactive to light.   Neck:      Thyroid: No thyromegaly.   Cardiovascular:      Rate and Rhythm: Normal rate and regular rhythm.      Heart sounds: Normal heart sounds.   Pulmonary:      Effort: Pulmonary effort is normal.      Breath sounds: Normal breath sounds.   Abdominal:      General: Bowel sounds are normal. There is no distension.      Palpations: Abdomen is soft.      Tenderness: There is no abdominal tenderness.   Musculoskeletal:         General: Normal range of motion.      Cervical back: Normal range of motion and neck supple.   Skin:     General: Skin is warm and dry.      Findings: No erythema.   Neurological:      Mental Status: She is alert and oriented to person, place, and time.      Cranial Nerves: No cranial nerve deficit.           Assessment:       1. Chest pain, unspecified type    2. Gastroesophageal  reflux disease, unspecified whether esophagitis present    3. Motion sickness, initial encounter         Plan:       Chest pain, unspecified type  Comments:  EKG without acute issue. Suspect more of a GI issue here. will start PPI now. want eval with GI ASAP. will warrant EGD given prior osiel en y.  Orders:  -     POCT EKG 12-LEAD (NOT FOR [facility] USE)  -     pantoprazole (PROTONIX) 40 MG tablet; Take 1 tablet (40 mg total) by mouth once daily.  Dispense: 30 tablet; Refill: 2    Gastroesophageal reflux disease, unspecified whether esophagitis present  Comments:  possibly hiatal hernia. PPI to start now.  Orders:  -     pantoprazole (PROTONIX) 40 MG tablet; Take 1 tablet (40 mg total) by mouth once daily.  Dispense: 30 tablet; Refill: 2  -     Ambulatory referral/consult to Gastroenterology; Future; Expected date: 07/08/2022    Motion sickness, initial encounter  Comments:  will be going on a cruise the next few weeks. request transderm scop  Orders:  -     scopolamine (TRANSDERM-SCOP) 1.3-1.5 mg (1 mg over 3 days); Place 1 patch onto the skin every 72 hours. for 12 days  Dispense: 4 patch; Refill: 0      Follow up in about 8 weeks (around 9/2/2022).        7/8/2022 Luis Alberto Guzman PA-C

## 2022-07-18 ENCOUNTER — OFFICE VISIT (OUTPATIENT)
Dept: URGENT CARE | Facility: CLINIC | Age: 52
End: 2022-07-18
Payer: MEDICAID

## 2022-07-18 VITALS
SYSTOLIC BLOOD PRESSURE: 126 MMHG | TEMPERATURE: 98 F | RESPIRATION RATE: 18 BRPM | WEIGHT: 201 LBS | OXYGEN SATURATION: 98 % | BODY MASS INDEX: 32.3 KG/M2 | HEART RATE: 97 BPM | HEIGHT: 66 IN | DIASTOLIC BLOOD PRESSURE: 78 MMHG

## 2022-07-18 DIAGNOSIS — Z71.84 ENCOUNTER FOR HEALTH COUNSELING RELATED TO TRAVEL: ICD-10-CM

## 2022-07-18 DIAGNOSIS — Z20.822 ENCOUNTER FOR LABORATORY TESTING FOR COVID-19 VIRUS: Primary | ICD-10-CM

## 2022-07-18 LAB
CTP QC/QA: YES
SARS-COV-2 AG RESP QL IA.RAPID: NEGATIVE

## 2022-07-18 PROCEDURE — 3074F PR MOST RECENT SYSTOLIC BLOOD PRESSURE < 130 MM HG: ICD-10-PCS | Mod: CPTII,S$GLB,, | Performed by: NURSE PRACTITIONER

## 2022-07-18 PROCEDURE — 3008F PR BODY MASS INDEX (BMI) DOCUMENTED: ICD-10-PCS | Mod: CPTII,S$GLB,, | Performed by: NURSE PRACTITIONER

## 2022-07-18 PROCEDURE — 3008F BODY MASS INDEX DOCD: CPT | Mod: CPTII,S$GLB,, | Performed by: NURSE PRACTITIONER

## 2022-07-18 PROCEDURE — 99203 OFFICE O/P NEW LOW 30 MIN: CPT | Mod: S$GLB,,, | Performed by: NURSE PRACTITIONER

## 2022-07-18 PROCEDURE — 3078F DIAST BP <80 MM HG: CPT | Mod: CPTII,S$GLB,, | Performed by: NURSE PRACTITIONER

## 2022-07-18 PROCEDURE — 99203 PR OFFICE/OUTPT VISIT, NEW, LEVL III, 30-44 MIN: ICD-10-PCS | Mod: S$GLB,,, | Performed by: NURSE PRACTITIONER

## 2022-07-18 PROCEDURE — 87811 SARS-COV-2 COVID19 W/OPTIC: CPT | Mod: QW,S$GLB,, | Performed by: NURSE PRACTITIONER

## 2022-07-18 PROCEDURE — 87811 SARS CORONAVIRUS 2 ANTIGEN POCT, MANUAL READ: ICD-10-PCS | Mod: QW,S$GLB,, | Performed by: NURSE PRACTITIONER

## 2022-07-18 PROCEDURE — 3078F PR MOST RECENT DIASTOLIC BLOOD PRESSURE < 80 MM HG: ICD-10-PCS | Mod: CPTII,S$GLB,, | Performed by: NURSE PRACTITIONER

## 2022-07-18 PROCEDURE — 3074F SYST BP LT 130 MM HG: CPT | Mod: CPTII,S$GLB,, | Performed by: NURSE PRACTITIONER

## 2022-07-18 NOTE — PATIENT INSTRUCTIONS
General Discharge Instructions   If you were prescribed a narcotic or controlled medication, do not drive or operate heavy equipment or machinery while taking these medications.  If you were prescribed antibiotics, please take them to completion.  You must understand that you've received an Urgent Care treatment only and that you may be released before all your medical problems are known or treated. You, the patient, will arrange for follow up care as instructed.  Follow up with your PCP or specialty clinic as directed in the next 1-2 weeks if not improved or as needed.  You can call [phone] to schedule an appointment with the appropriate provider.  If your condition worsens we recommend that you receive another evaluation at the emergency room immediately or contact your primary medical clinics after hours call service to discuss your concerns.  Please return here or go to the Emergency Department for any concerns or worsening of condition.      WE CANNOT RULE OUT ALL POSSIBLE CAUSES OF YOUR SYMPTOMS IN THE URGENT CARE SETTING PLEASE GO TO THE ER IF YOU FEELS YOUR CONDITION IS WORSENING OR YOU WOULD LIKE EMERGENT EVALUATION.   
yes

## 2022-07-18 NOTE — PROGRESS NOTES
"Subjective:       Patient ID: Sakshi Plummer is a 52 y.o. female.    Vitals:  height is 5' 6" (1.676 m) and weight is 91.2 kg (201 lb). Her oral temperature is 97.9 °F (36.6 °C). Her blood pressure is 126/78 and her pulse is 97. Her respiration is 18 and oxygen saturation is 98%.     Chief Complaint: COVID-19 Concerns    Pt needs covid test for travel, pt denies any covid symptoms.    ROS    Objective:      Physical Exam   HENT:   Head: Normocephalic.   Ears:   Right Ear: External ear normal.   Left Ear: External ear normal.   Pulmonary/Chest: Effort normal. No respiratory distress.   Abdominal: Normal appearance.   Neurological: no focal deficit. She is alert.   Nursing note and vitals reviewed.        Results for orders placed or performed in visit on 07/18/22   SARS Coronavirus 2 Antigen, POCT Manual Read   Result Value Ref Range    SARS Coronavirus 2 Antigen Negative Negative     Acceptable Yes      Assessment:       1. Encounter for laboratory testing for COVID-19 virus    2. Encounter for health counseling related to travel          Plan:     Pt here for asymptomatic Covid testing for travel  Covid negative    Encounter for laboratory testing for COVID-19 virus  -     SARS Coronavirus 2 Antigen, POCT Manual Read    Encounter for health counseling related to travel               Patient Instructions   General Discharge Instructions   If you were prescribed a narcotic or controlled medication, do not drive or operate heavy equipment or machinery while taking these medications.  If you were prescribed antibiotics, please take them to completion.  You must understand that you've received an Urgent Care treatment only and that you may be released before all your medical problems are known or treated. You, the patient, will arrange for follow up care as instructed.  Follow up with your PCP or specialty clinic as directed in the next 1-2 weeks if not improved or as needed.  You can call [phone] to schedule an appointment with the appropriate provider.  If your condition worsens we recommend that you receive another evaluation at the emergency room immediately or contact your primary medical clinics after hours call service to discuss your concerns.  Please return here or go to the Emergency Department for any concerns or worsening of condition.      WE CANNOT RULE OUT ALL POSSIBLE CAUSES OF YOUR SYMPTOMS IN THE URGENT CARE SETTING PLEASE GO TO THE ER IF YOU FEELS YOUR CONDITION IS WORSENING OR YOU WOULD LIKE EMERGENT EVALUATION.

## 2022-09-12 ENCOUNTER — OFFICE VISIT (OUTPATIENT)
Dept: FAMILY MEDICINE | Facility: CLINIC | Age: 52
End: 2022-09-12
Payer: COMMERCIAL

## 2022-09-12 VITALS
HEIGHT: 66 IN | SYSTOLIC BLOOD PRESSURE: 124 MMHG | TEMPERATURE: 98 F | BODY MASS INDEX: 31.82 KG/M2 | WEIGHT: 198 LBS | DIASTOLIC BLOOD PRESSURE: 80 MMHG | HEART RATE: 90 BPM

## 2022-09-12 DIAGNOSIS — K21.9 GASTROESOPHAGEAL REFLUX DISEASE, UNSPECIFIED WHETHER ESOPHAGITIS PRESENT: ICD-10-CM

## 2022-09-12 DIAGNOSIS — J06.9 UPPER RESPIRATORY TRACT INFECTION, UNSPECIFIED TYPE: Primary | ICD-10-CM

## 2022-09-12 PROCEDURE — 3008F PR BODY MASS INDEX (BMI) DOCUMENTED: ICD-10-PCS | Mod: CPTII,S$GLB,, | Performed by: PHYSICIAN ASSISTANT

## 2022-09-12 PROCEDURE — 3074F SYST BP LT 130 MM HG: CPT | Mod: CPTII,S$GLB,, | Performed by: PHYSICIAN ASSISTANT

## 2022-09-12 PROCEDURE — 99213 OFFICE O/P EST LOW 20 MIN: CPT | Mod: S$GLB,,, | Performed by: PHYSICIAN ASSISTANT

## 2022-09-12 PROCEDURE — 99213 PR OFFICE/OUTPT VISIT, EST, LEVL III, 20-29 MIN: ICD-10-PCS | Mod: S$GLB,,, | Performed by: PHYSICIAN ASSISTANT

## 2022-09-12 PROCEDURE — 1159F PR MEDICATION LIST DOCUMENTED IN MEDICAL RECORD: ICD-10-PCS | Mod: CPTII,S$GLB,, | Performed by: PHYSICIAN ASSISTANT

## 2022-09-12 PROCEDURE — 3079F DIAST BP 80-89 MM HG: CPT | Mod: CPTII,S$GLB,, | Performed by: PHYSICIAN ASSISTANT

## 2022-09-12 PROCEDURE — 1159F MED LIST DOCD IN RCRD: CPT | Mod: CPTII,S$GLB,, | Performed by: PHYSICIAN ASSISTANT

## 2022-09-12 PROCEDURE — 3079F PR MOST RECENT DIASTOLIC BLOOD PRESSURE 80-89 MM HG: ICD-10-PCS | Mod: CPTII,S$GLB,, | Performed by: PHYSICIAN ASSISTANT

## 2022-09-12 PROCEDURE — 3074F PR MOST RECENT SYSTOLIC BLOOD PRESSURE < 130 MM HG: ICD-10-PCS | Mod: CPTII,S$GLB,, | Performed by: PHYSICIAN ASSISTANT

## 2022-09-12 PROCEDURE — 3008F BODY MASS INDEX DOCD: CPT | Mod: CPTII,S$GLB,, | Performed by: PHYSICIAN ASSISTANT

## 2022-09-12 NOTE — PROGRESS NOTES
"  SUBJECTIVE:    Patient ID: Sakshi Plummer is a 52 y.o. female.    Chief Complaint: Sore Throat (For 4 days // cough // no bottles // abc )    This is a 52-year-old female who presents today for 8 week follow-up.  Started on PPI for worsening GERD. Reports that it did significantly help for about a week and then she felt better so she stopped taking it. She also suffers from anxiety/depression and uses Ativan p.r.n.    Today she also reports upper respiratory symptoms, sore throat and a cough for 4 days. Says that overall very mild sickness now. Had covid prior and was different, much more severe sxs. Slight ear pain.       Office Visit on 07/18/2022   Component Date Value Ref Range Status    SARS Coronavirus 2 Antigen 07/18/2022 Negative  Negative Final     Acceptable 07/18/2022 Yes   Final   Admission on 04/11/2022, Discharged on 04/11/2022   Component Date Value Ref Range Status    Final Pathologic Diagnosis 04/11/2022    Final                    Value:1. Colon, ascending, polyps, biopsy:  - Tubular adenoma, single fragment.  - Hyperplastic polyp, multiple fragments.  2. Colon, transverse, polyp, biopsy:  - Sessile serrated lesion with low-grade cytologic dysplasia, see comment.  3. Colon, sigmoid, polyp, biopsy:  - Hyperplastic polyp.  COMMENT:  Part 2 this case is reviewed by Dr. ANNABELLE Bhagat who agrees with the  above diagnosis.      Gross 04/11/2022    Final                    Value:Number of containers:  3  Part 1  Container Label: Clinic Number/AP Number:  [id number], and "ascending colon  polyps"  Received in formalin in polyp trap 1 is a 9 x 6 x 2 mm aggregate of soft tan  polypoid tissue fragments.  Specimen is filtered, stained with Hematoxylin,  and entirely submitted in ZLZ--1-A.  Part 2  Container Label: Clinic Number/AP Number:  [id number], and "transverse colon  polyp"  Received in formalin is an 11 x 9 x 6 mm soft tan-brown polypoid tissue  fragment.  The base is inked blue.  Specimen " "is serially sectioned and  entirely submitted in CMW--2-A.  Part 3  Container Label: Clinic Number/AP Number:  [id number], and "sigmoid colon polyp"  Received in formalin is a 3 x 2 x 1 mm soft tan polypoid tissue fragment.  Entirely submitted in RBB--3-A.  AUGUSTO Pearce.      Disclaimer 2022    Final                    Value:Unless the case is a 'gross only' or additional testing only, the final  diagnosis for each specimen is based on a microscopic examination of  appropriate tissue sections.         Past Medical History:   Diagnosis Date    Anemia     Anxiety     Arthritis     Depression     Encounter for blood transfusion 2009    iron deficency    Osteochondroma of right tibia      Past Surgical History:   Procedure Laterality Date     SECTION      COLONOSCOPY N/A 2022    Procedure: COLONOSCOPY;  Surgeon: Brian Clements MD;  Location: [facility];  Service: Endoscopy;  Laterality: N/A;    COSMETIC SURGERY Bilateral     Breast implants    GASTRIC BYPASS      HYSTERECTOMY      KNEE SURGERY Right     OsteoChondroma removed     Family History   Problem Relation Age of Onset    Diabetes Mother     No Known Problems Father        Marital Status: Single  Alcohol History:  reports current alcohol use.  Tobacco History:  reports that she has never smoked. She has never used smokeless tobacco.  Drug History:  reports no history of drug use.    Review of patient's allergies indicates:   Allergen Reactions    Codeine Other (See Comments)     nightmares    Trazodone Other (See Comments)     nightmares       Current Outpatient Medications:     cetirizine (ZYRTEC) 10 MG tablet, Take 10 mg by mouth once daily., Disp: , Rfl:     cyclobenzaprine (FLEXERIL) 10 MG tablet, Take 10 mg by mouth 3 (three) times daily as needed for Muscle spasms., Disp: , Rfl:     LORazepam (ATIVAN) 0.5 MG tablet, Take 1 tablet (0.5 mg total) by mouth every evening., Disp: 30 tablet, Rfl: 5    " "pantoprazole (PROTONIX) 40 MG tablet, Take 1 tablet (40 mg total) by mouth once daily., Disp: 30 tablet, Rfl: 2    triamcinolone (NASACORT) 55 mcg nasal inhaler, 2 sprays by Nasal route once daily., Disp: , Rfl:     Review of Systems   Constitutional:  Negative for appetite change, chills, fatigue, fever and unexpected weight change.   HENT:  Positive for congestion, ear pain and rhinorrhea.    Respiratory:  Negative for cough, chest tightness and shortness of breath.    Cardiovascular:  Negative for chest pain and palpitations.   Gastrointestinal:  Negative for abdominal distention and abdominal pain.   Endocrine: Negative for cold intolerance and heat intolerance.   Genitourinary:  Negative for difficulty urinating and dysuria.   Musculoskeletal:  Negative for arthralgias and back pain.   Neurological:  Negative for dizziness, weakness and headaches.        Objective:      Vitals:    09/12/22 0706   BP: 124/80   Pulse: 90   Temp: 98.3 °F (36.8 °C)   Weight: 89.8 kg (198 lb)   Height: 5' 6" (1.676 m)     Physical Exam  Constitutional:       General: She is not in acute distress.     Appearance: She is well-developed.   HENT:      Head: Normocephalic and atraumatic.   Eyes:      Conjunctiva/sclera: Conjunctivae normal.      Pupils: Pupils are equal, round, and reactive to light.   Neck:      Thyroid: No thyromegaly.   Cardiovascular:      Rate and Rhythm: Normal rate and regular rhythm.      Heart sounds: Normal heart sounds.   Pulmonary:      Effort: Pulmonary effort is normal.      Breath sounds: Normal breath sounds.   Abdominal:      General: Bowel sounds are normal. There is no distension.      Palpations: Abdomen is soft.      Tenderness: There is no abdominal tenderness.   Musculoskeletal:         General: Normal range of motion.      Cervical back: Normal range of motion and neck supple.   Skin:     General: Skin is warm and dry.      Findings: No erythema.   Neurological:      Mental Status: She is alert " and oriented to person, place, and time.      Cranial Nerves: No cranial nerve deficit.         Assessment:       1. Upper respiratory tract infection, unspecified type    2. Gastroesophageal reflux disease, unspecified whether esophagitis present         Plan:       Upper respiratory tract infection, unspecified type  Comments:  likely viral/allergy. will treat sxs now. No evidence for bacterial/more severe infection at this time.    Gastroesophageal reflux disease, unspecified whether esophagitis present  Comments:  Not requiring PPI at this time. will continue as is and if sxs worsen would need to consider EGD at that time.    Follow up if symptoms worsen or fail to improve.        9/12/2022 Luis Alberto Guzman PA-C

## 2022-12-16 ENCOUNTER — PATIENT MESSAGE (OUTPATIENT)
Dept: FAMILY MEDICINE | Facility: CLINIC | Age: 52
End: 2022-12-16

## 2022-12-16 DIAGNOSIS — M62.838 MUSCLE SPASM: ICD-10-CM

## 2022-12-16 DIAGNOSIS — Z12.31 SCREENING MAMMOGRAM FOR HIGH-RISK PATIENT: Primary | ICD-10-CM

## 2022-12-16 RX ORDER — CYCLOBENZAPRINE HCL 10 MG
10 TABLET ORAL 3 TIMES DAILY PRN
Qty: 90 TABLET | Refills: 0 | Status: SHIPPED | OUTPATIENT
Start: 2022-12-16 | End: 2022-12-26

## 2023-01-09 DIAGNOSIS — Z12.31 ENCOUNTER FOR SCREENING MAMMOGRAM FOR MALIGNANT NEOPLASM OF BREAST: Primary | ICD-10-CM

## 2023-02-13 ENCOUNTER — TELEPHONE (OUTPATIENT)
Dept: FAMILY MEDICINE | Facility: CLINIC | Age: 53
End: 2023-02-13

## 2023-02-13 NOTE — TELEPHONE ENCOUNTER
----- Message from Sakshi Knutson sent at 2/13/2023 11:40 AM CST -----  Pt called to check on status of her Zpak she states that her pharmacy will close at 5pm. [phone]

## 2023-02-13 NOTE — TELEPHONE ENCOUNTER
Pt states started Thursday Body aches, head congestion, coughing, sneezing. Denies fever. Has felix taking mucnex, and over the counter cold and flu.  requesting zpack pharm [facility]

## 2023-02-13 NOTE — TELEPHONE ENCOUNTER
----- Message from Sakshi Knutson sent at 2/13/2023  8:03 AM CST -----  Pt called and stated that she has been sick since Thursday and still not getting any better. She have cough congestion body aches. She would like to be seen or get a script called in. [phone]        [facility] pharmacy.

## 2023-03-01 ENCOUNTER — TELEPHONE (OUTPATIENT)
Dept: FAMILY MEDICINE | Facility: CLINIC | Age: 53
End: 2023-03-01

## 2023-03-01 DIAGNOSIS — Z79.899 ENCOUNTER FOR LONG-TERM (CURRENT) USE OF OTHER MEDICATIONS: Primary | ICD-10-CM

## 2023-03-02 ENCOUNTER — PATIENT MESSAGE (OUTPATIENT)
Dept: FAMILY MEDICINE | Facility: CLINIC | Age: 53
End: 2023-03-02

## 2023-04-11 ENCOUNTER — PATIENT MESSAGE (OUTPATIENT)
Dept: ADMINISTRATIVE | Facility: HOSPITAL | Age: 53
End: 2023-04-11
Payer: MEDICAID

## 2023-07-03 DIAGNOSIS — F51.01 PRIMARY INSOMNIA: ICD-10-CM

## 2023-07-03 RX ORDER — LORAZEPAM 0.5 MG/1
0.5 TABLET ORAL NIGHTLY
Qty: 30 TABLET | Refills: 5 | Status: CANCELLED | OUTPATIENT
Start: 2023-07-03 | End: 2023-12-30

## 2023-07-05 DIAGNOSIS — F51.01 PRIMARY INSOMNIA: ICD-10-CM

## 2023-07-05 RX ORDER — LORAZEPAM 0.5 MG/1
0.5 TABLET ORAL NIGHTLY
Qty: 30 TABLET | Refills: 1 | Status: SHIPPED | OUTPATIENT
Start: 2023-07-05 | End: 2023-08-21 | Stop reason: SDUPTHER

## 2023-08-17 ENCOUNTER — TELEPHONE (OUTPATIENT)
Dept: FAMILY MEDICINE | Facility: CLINIC | Age: 53
End: 2023-08-17

## 2023-08-17 NOTE — TELEPHONE ENCOUNTER
----- Message from Jake Sanabria MA sent at 8/17/2023 11:58 AM CDT -----  [phone] Patient is requesting  a hormonal labs ( Estrogen level test )  please call the Pt if this could be added for her labs being done on tomorrow (8/18/23) for her up coming visit on 8/21/23

## 2023-08-21 ENCOUNTER — OFFICE VISIT (OUTPATIENT)
Dept: FAMILY MEDICINE | Facility: CLINIC | Age: 53
End: 2023-08-21
Payer: COMMERCIAL

## 2023-08-21 VITALS
BODY MASS INDEX: 33.91 KG/M2 | HEART RATE: 78 BPM | HEIGHT: 66 IN | DIASTOLIC BLOOD PRESSURE: 86 MMHG | SYSTOLIC BLOOD PRESSURE: 136 MMHG | OXYGEN SATURATION: 98 % | WEIGHT: 211 LBS

## 2023-08-21 DIAGNOSIS — Z79.899 ENCOUNTER FOR LONG-TERM (CURRENT) USE OF OTHER MEDICATIONS: Primary | ICD-10-CM

## 2023-08-21 DIAGNOSIS — F51.01 PRIMARY INSOMNIA: ICD-10-CM

## 2023-08-21 DIAGNOSIS — M62.838 MUSCLE SPASM: ICD-10-CM

## 2023-08-21 DIAGNOSIS — F41.9 ANXIETY: ICD-10-CM

## 2023-08-21 LAB
ALBUMIN SERPL-MCNC: 4.5 G/DL (ref 3.6–5.1)
ALBUMIN/GLOB SERPL: 1.7 (CALC) (ref 1–2.5)
ALP SERPL-CCNC: 64 U/L (ref 37–153)
ALT SERPL-CCNC: 15 U/L (ref 6–29)
APPEARANCE UR: ABNORMAL
AST SERPL-CCNC: 16 U/L (ref 10–35)
BACTERIA #/AREA URNS HPF: ABNORMAL /HPF
BACTERIA UR CULT: ABNORMAL
BACTERIA UR CULT: ABNORMAL
BASOPHILS # BLD AUTO: 62 CELLS/UL (ref 0–200)
BASOPHILS NFR BLD AUTO: 0.8 %
BILIRUB SERPL-MCNC: 0.7 MG/DL (ref 0.2–1.2)
BILIRUB UR QL STRIP: NEGATIVE
BUN SERPL-MCNC: 23 MG/DL (ref 7–25)
BUN/CREAT SERPL: NORMAL (CALC) (ref 6–22)
CALCIUM SERPL-MCNC: 9.8 MG/DL (ref 8.6–10.4)
CHLORIDE SERPL-SCNC: 106 MMOL/L (ref 98–110)
CHOLEST SERPL-MCNC: 180 MG/DL
CHOLEST/HDLC SERPL: 3.2 (CALC)
CO2 SERPL-SCNC: 23 MMOL/L (ref 20–32)
COLOR UR: YELLOW
CREAT SERPL-MCNC: 0.98 MG/DL (ref 0.5–1.03)
EGFR: 69 ML/MIN/1.73M2
EOSINOPHIL # BLD AUTO: 273 CELLS/UL (ref 15–500)
EOSINOPHIL NFR BLD AUTO: 3.5 %
ERYTHROCYTE [DISTWIDTH] IN BLOOD BY AUTOMATED COUNT: 13.5 % (ref 11–15)
GLOBULIN SER CALC-MCNC: 2.6 G/DL (CALC) (ref 1.9–3.7)
GLUCOSE SERPL-MCNC: 79 MG/DL (ref 65–99)
GLUCOSE UR QL STRIP: NEGATIVE
HCT VFR BLD AUTO: 45.5 % (ref 35–45)
HDLC SERPL-MCNC: 56 MG/DL
HGB BLD-MCNC: 14.8 G/DL (ref 11.7–15.5)
HGB UR QL STRIP: NEGATIVE
HYALINE CASTS #/AREA URNS LPF: ABNORMAL /LPF
KETONES UR QL STRIP: NEGATIVE
LDLC SERPL CALC-MCNC: 108 MG/DL (CALC)
LEUKOCYTE ESTERASE UR QL STRIP: ABNORMAL
LYMPHOCYTES # BLD AUTO: 3159 CELLS/UL (ref 850–3900)
LYMPHOCYTES NFR BLD AUTO: 40.5 %
MCH RBC QN AUTO: 27.7 PG (ref 27–33)
MCHC RBC AUTO-ENTMCNC: 32.5 G/DL (ref 32–36)
MCV RBC AUTO: 85.2 FL (ref 80–100)
MONOCYTES # BLD AUTO: 523 CELLS/UL (ref 200–950)
MONOCYTES NFR BLD AUTO: 6.7 %
NEUTROPHILS # BLD AUTO: 3783 CELLS/UL (ref 1500–7800)
NEUTROPHILS NFR BLD AUTO: 48.5 %
NITRITE UR QL STRIP: POSITIVE
NONHDLC SERPL-MCNC: 124 MG/DL (CALC)
PH UR STRIP: 5.5 [PH] (ref 5–8)
PLATELET # BLD AUTO: 336 THOUSAND/UL (ref 140–400)
PMV BLD REES-ECKER: 10.2 FL (ref 7.5–12.5)
POTASSIUM SERPL-SCNC: 4.6 MMOL/L (ref 3.5–5.3)
PROT SERPL-MCNC: 7.1 G/DL (ref 6.1–8.1)
PROT UR QL STRIP: NEGATIVE
RBC # BLD AUTO: 5.34 MILLION/UL (ref 3.8–5.1)
RBC #/AREA URNS HPF: ABNORMAL /HPF
SERVICE CMNT-IMP: ABNORMAL
SODIUM SERPL-SCNC: 141 MMOL/L (ref 135–146)
SP GR UR STRIP: 1.02 (ref 1–1.03)
SQUAMOUS #/AREA URNS HPF: ABNORMAL /HPF
TRIGL SERPL-MCNC: 75 MG/DL
TSH SERPL-ACNC: 0.97 MIU/L
WBC # BLD AUTO: 7.8 THOUSAND/UL (ref 3.8–10.8)
WBC #/AREA URNS HPF: ABNORMAL /HPF

## 2023-08-21 PROCEDURE — 3079F DIAST BP 80-89 MM HG: CPT | Mod: CPTII,S$GLB,, | Performed by: PHYSICIAN ASSISTANT

## 2023-08-21 PROCEDURE — 3008F PR BODY MASS INDEX (BMI) DOCUMENTED: ICD-10-PCS | Mod: CPTII,S$GLB,, | Performed by: PHYSICIAN ASSISTANT

## 2023-08-21 PROCEDURE — 1159F PR MEDICATION LIST DOCUMENTED IN MEDICAL RECORD: ICD-10-PCS | Mod: CPTII,S$GLB,, | Performed by: PHYSICIAN ASSISTANT

## 2023-08-21 PROCEDURE — 3079F PR MOST RECENT DIASTOLIC BLOOD PRESSURE 80-89 MM HG: ICD-10-PCS | Mod: CPTII,S$GLB,, | Performed by: PHYSICIAN ASSISTANT

## 2023-08-21 PROCEDURE — 1159F MED LIST DOCD IN RCRD: CPT | Mod: CPTII,S$GLB,, | Performed by: PHYSICIAN ASSISTANT

## 2023-08-21 PROCEDURE — 3075F PR MOST RECENT SYSTOLIC BLOOD PRESS GE 130-139MM HG: ICD-10-PCS | Mod: CPTII,S$GLB,, | Performed by: PHYSICIAN ASSISTANT

## 2023-08-21 PROCEDURE — 3075F SYST BP GE 130 - 139MM HG: CPT | Mod: CPTII,S$GLB,, | Performed by: PHYSICIAN ASSISTANT

## 2023-08-21 PROCEDURE — 3008F BODY MASS INDEX DOCD: CPT | Mod: CPTII,S$GLB,, | Performed by: PHYSICIAN ASSISTANT

## 2023-08-21 PROCEDURE — 99214 PR OFFICE/OUTPT VISIT, EST, LEVL IV, 30-39 MIN: ICD-10-PCS | Mod: S$GLB,,, | Performed by: PHYSICIAN ASSISTANT

## 2023-08-21 PROCEDURE — 99214 OFFICE O/P EST MOD 30 MIN: CPT | Mod: S$GLB,,, | Performed by: PHYSICIAN ASSISTANT

## 2023-08-21 RX ORDER — LORAZEPAM 1 MG/1
1 TABLET ORAL NIGHTLY
Qty: 30 TABLET | Refills: 2 | Status: SHIPPED | OUTPATIENT
Start: 2023-08-21 | End: 2024-02-09 | Stop reason: SDUPTHER

## 2023-08-21 RX ORDER — CYCLOBENZAPRINE HCL 10 MG
10 TABLET ORAL 3 TIMES DAILY PRN
Qty: 60 TABLET | Refills: 2 | Status: SHIPPED | OUTPATIENT
Start: 2023-08-21 | End: 2023-11-19

## 2023-08-21 NOTE — PROGRESS NOTES
SUBJECTIVE:    Patient ID: Sakshi Horner is a 53 y.o. female.    Chief Complaint: Check Up / Med Refills; discuss increasing ativan; c/o numbness/tingling in hands ; still experiencing knee pain, injury 11/2022 ; and discuss wegovy for weight loss    This is a 53 year old female presenting for regular follow up. Labs reviewed and appear stable -- U/A?    discuss increasing ativan  c/o numbness/tingling in hands  and still experiencing knee pain, injury 11/2022         Orders Only on 08/18/2023   Component Date Value Ref Range Status    Cholesterol 08/18/2023 180  <200 mg/dL Final    HDL 08/18/2023 56  > OR = 50 mg/dL Final    Triglycerides 08/18/2023 75  <150 mg/dL Final    LDL Cholesterol 08/18/2023 108 (H)  mg/dL (calc) Final    HDL/Cholesterol Ratio 08/18/2023 3.2  <5.0 (calc) Final    Non HDL Chol. (LDL+VLDL) 08/18/2023 124  <130 mg/dL (calc) Final    Glucose 08/18/2023 79  65 - 99 mg/dL Final    BUN 08/18/2023 23  7 - 25 mg/dL Final    Creatinine 08/18/2023 0.98  0.50 - 1.03 mg/dL Final    eGFR 08/18/2023 69  > OR = 60 mL/min/1.73m2 Final    BUN/Creatinine Ratio 08/18/2023 SEE NOTE:  6 - 22 (calc) Final    Sodium 08/18/2023 141  135 - 146 mmol/L Final    Potassium 08/18/2023 4.6  3.5 - 5.3 mmol/L Final    Chloride 08/18/2023 106  98 - 110 mmol/L Final    CO2 08/18/2023 23  20 - 32 mmol/L Final    Calcium 08/18/2023 9.8  8.6 - 10.4 mg/dL Final    Total Protein 08/18/2023 7.1  6.1 - 8.1 g/dL Final    Albumin 08/18/2023 4.5  3.6 - 5.1 g/dL Final    Globulin, Total 08/18/2023 2.6  1.9 - 3.7 g/dL (calc) Final    Albumin/Globulin Ratio 08/18/2023 1.7  1.0 - 2.5 (calc) Final    Total Bilirubin 08/18/2023 0.7  0.2 - 1.2 mg/dL Final    Alkaline Phosphatase 08/18/2023 64  37 - 153 U/L Final    AST 08/18/2023 16  10 - 35 U/L Final    ALT 08/18/2023 15  6 - 29 U/L Final    Color, UA 08/18/2023 YELLOW  YELLOW Final    Appearance, UA 08/18/2023 CLOUDY (A)  CLEAR Final    Specific Gravity, UA 08/18/2023 1.016  1.001 - 1.035  Final    pH, UA 08/18/2023 5.5  5.0 - 8.0 Final    Glucose, UA 08/18/2023 NEGATIVE  NEGATIVE Final    Bilirubin, UA 08/18/2023 NEGATIVE  NEGATIVE Final    Ketones, UA 08/18/2023 NEGATIVE  NEGATIVE Final    Occult Blood UA 08/18/2023 NEGATIVE  NEGATIVE Final    Protein, UA 08/18/2023 NEGATIVE  NEGATIVE Final    Nitrite, UA 08/18/2023 POSITIVE (A)  NEGATIVE Final    Leukocytes, UA 08/18/2023 1+ (A)  NEGATIVE Final    WBC Casts, UA 08/18/2023 0-5  < OR = 5 /HPF Final    RBC Casts, UA 08/18/2023 NONE SEEN  < OR = 2 /HPF Final    Squam Epithel, UA 08/18/2023 0-5  < OR = 5 /HPF Final    Bacteria, UA 08/18/2023 MANY (A)  NONE SEEN /HPF Final    Hyaline Casts, UA 08/18/2023 NONE SEEN  NONE SEEN /LPF Final    Service Cmt: 08/18/2023    Final    Reflexive Urine Culture 08/18/2023    Final    Urine Culture, Routine 08/18/2023  (A)   Final    WBC 08/18/2023 7.8  3.8 - 10.8 Thousand/uL Final    RBC 08/18/2023 5.34 (H)  3.80 - 5.10 Million/uL Final    Hemoglobin 08/18/2023 14.8  11.7 - 15.5 g/dL Final    Hematocrit 08/18/2023 45.5 (H)  35.0 - 45.0 % Final    MCV 08/18/2023 85.2  80.0 - 100.0 fL Final    MCH 08/18/2023 27.7  27.0 - 33.0 pg Final    MCHC 08/18/2023 32.5  32.0 - 36.0 g/dL Final    RDW 08/18/2023 13.5  11.0 - 15.0 % Final    Platelets 08/18/2023 336  140 - 400 Thousand/uL Final    MPV 08/18/2023 10.2  7.5 - 12.5 fL Final    Neutrophils, Abs 08/18/2023 3,783  1,500 - 7,800 cells/uL Final    Lymph # 08/18/2023 3,159  850 - 3,900 cells/uL Final    Mono # 08/18/2023 523  200 - 950 cells/uL Final    Eos # 08/18/2023 273  15 - 500 cells/uL Final    Baso # 08/18/2023 62  0 - 200 cells/uL Final    Neutrophils Relative 08/18/2023 48.5  % Final    Lymph % 08/18/2023 40.5  % Final    Mono % 08/18/2023 6.7  % Final    Eosinophil % 08/18/2023 3.5  % Final    Basophil % 08/18/2023 0.8  % Final    TSH w/reflex to FT4 08/18/2023 0.97  mIU/L Final       Past Medical History:   Diagnosis Date    Anemia     Anxiety     Arthritis      "Depression     Encounter for blood transfusion 2009    iron deficency    Osteochondroma of right tibia      Social History     Socioeconomic History    Marital status:    Tobacco Use    Smoking status: Never    Smokeless tobacco: Never   Substance and Sexual Activity    Alcohol use: Yes     Comment: socially    Drug use: No     Past Surgical History:   Procedure Laterality Date     SECTION      COLONOSCOPY N/A 2022    Procedure: COLONOSCOPY;  Surgeon: Brian Clements MD;  Location: [facility];  Service: Endoscopy;  Laterality: N/A;    COSMETIC SURGERY Bilateral     Breast implants    GASTRIC BYPASS      HYSTERECTOMY      KNEE SURGERY Right     OsteoChondroma removed     Family History   Problem Relation Age of Onset    Diabetes Mother     No Known Problems Father        Review of patient's allergies indicates:   Allergen Reactions    Codeine Other (See Comments)     nightmares    Trazodone Other (See Comments)     nightmares       Current Outpatient Medications:     cetirizine (ZYRTEC) 10 MG tablet, Take 10 mg by mouth once daily., Disp: , Rfl:     cyclobenzaprine (FLEXERIL) 10 MG tablet, Take 10 mg by mouth 3 (three) times daily as needed for Muscle spasms., Disp: , Rfl:     LORazepam (ATIVAN) 0.5 MG tablet, Take 1 tablet (0.5 mg total) by mouth every evening., Disp: 30 tablet, Rfl: 1    pantoprazole (PROTONIX) 40 MG tablet, Take 1 tablet (40 mg total) by mouth once daily., Disp: 30 tablet, Rfl: 2    triamcinolone (NASACORT) 55 mcg nasal inhaler, 2 sprays by Nasal route once daily., Disp: , Rfl:     Review of Systems       Objective:      Vitals:    23 1559 23 1601   BP: (!) 152/86 (!) 152/90   Pulse: 80    SpO2: 98%    Weight: 95.7 kg (211 lb)    Height: 5' 6" (1.676 m)      Physical Exam      Assessment:       1. Encounter for long-term (current) use of other medications         Plan:       Encounter for long-term (current) use of other medications      No " follow-ups on file.        8/21/2023 Laurita Whitmore

## 2023-08-21 NOTE — PROGRESS NOTES
SUBJECTIVE:    Patient ID: Sakshi Horner is a 53 y.o. female.    Chief Complaint: Check Up / Med Refills; discuss increasing ativan; c/o numbness/tingling in hands ; still experiencing knee pain, injury 11/2022 ; and discuss wegovy for weight loss    This is a 53 year old female presenting for regular follow up. Labs reviewed; U/A with +nitrites and leukocytes but otherwise appear stable. She denies any recent urinary symptoms. No dysuria, frequency, urgency.     Reports of recent increase in anxiety and insomnia. She is living with her mother in law who is suffering from dementia. She also notes of continued struggles with her children and grandchildren. Occasionally takes 2 ativan at night for insomnia and is concerned about running out of her rx early. Requesting double her current dose or trying a new medication. Also mentions some feelings of depression and recent weight gain. She is adamant on not wanting to be on an antidepressive.     Notes of intermittent numbness/tingling of the bilateral hands. Sleeps with wrists flexed at night. Wears copper gloves some days. Has not tried at night. States that symptoms do not seem to be as severe as in the past. Also notes of on-going right knee pain following surgical removal of R tibial osteochondroma in 2009. She does not follow ortho and does not wish to follow with them currently.        Orders Only on 08/18/2023   Component Date Value Ref Range Status    Cholesterol 08/18/2023 180  <200 mg/dL Final    HDL 08/18/2023 56  > OR = 50 mg/dL Final    Triglycerides 08/18/2023 75  <150 mg/dL Final    LDL Cholesterol 08/18/2023 108 (H)  mg/dL (calc) Final    HDL/Cholesterol Ratio 08/18/2023 3.2  <5.0 (calc) Final    Non HDL Chol. (LDL+VLDL) 08/18/2023 124  <130 mg/dL (calc) Final    Glucose 08/18/2023 79  65 - 99 mg/dL Final    BUN 08/18/2023 23  7 - 25 mg/dL Final    Creatinine 08/18/2023 0.98  0.50 - 1.03 mg/dL Final    eGFR 08/18/2023 69  > OR = 60 mL/min/1.73m2 Final     BUN/Creatinine Ratio 08/18/2023 SEE NOTE:  6 - 22 (calc) Final    Sodium 08/18/2023 141  135 - 146 mmol/L Final    Potassium 08/18/2023 4.6  3.5 - 5.3 mmol/L Final    Chloride 08/18/2023 106  98 - 110 mmol/L Final    CO2 08/18/2023 23  20 - 32 mmol/L Final    Calcium 08/18/2023 9.8  8.6 - 10.4 mg/dL Final    Total Protein 08/18/2023 7.1  6.1 - 8.1 g/dL Final    Albumin 08/18/2023 4.5  3.6 - 5.1 g/dL Final    Globulin, Total 08/18/2023 2.6  1.9 - 3.7 g/dL (calc) Final    Albumin/Globulin Ratio 08/18/2023 1.7  1.0 - 2.5 (calc) Final    Total Bilirubin 08/18/2023 0.7  0.2 - 1.2 mg/dL Final    Alkaline Phosphatase 08/18/2023 64  37 - 153 U/L Final    AST 08/18/2023 16  10 - 35 U/L Final    ALT 08/18/2023 15  6 - 29 U/L Final    Color, UA 08/18/2023 YELLOW  YELLOW Final    Appearance, UA 08/18/2023 CLOUDY (A)  CLEAR Final    Specific Gravity, UA 08/18/2023 1.016  1.001 - 1.035 Final    pH, UA 08/18/2023 5.5  5.0 - 8.0 Final    Glucose, UA 08/18/2023 NEGATIVE  NEGATIVE Final    Bilirubin, UA 08/18/2023 NEGATIVE  NEGATIVE Final    Ketones, UA 08/18/2023 NEGATIVE  NEGATIVE Final    Occult Blood UA 08/18/2023 NEGATIVE  NEGATIVE Final    Protein, UA 08/18/2023 NEGATIVE  NEGATIVE Final    Nitrite, UA 08/18/2023 POSITIVE (A)  NEGATIVE Final    Leukocytes, UA 08/18/2023 1+ (A)  NEGATIVE Final    WBC Casts, UA 08/18/2023 0-5  < OR = 5 /HPF Final    RBC Casts, UA 08/18/2023 NONE SEEN  < OR = 2 /HPF Final    Squam Epithel, UA 08/18/2023 0-5  < OR = 5 /HPF Final    Bacteria, UA 08/18/2023 MANY (A)  NONE SEEN /HPF Final    Hyaline Casts, UA 08/18/2023 NONE SEEN  NONE SEEN /LPF Final    Service Cmt: 08/18/2023    Final    Reflexive Urine Culture 08/18/2023    Final    Urine Culture, Routine 08/18/2023  (A)   Final    WBC 08/18/2023 7.8  3.8 - 10.8 Thousand/uL Final    RBC 08/18/2023 5.34 (H)  3.80 - 5.10 Million/uL Final    Hemoglobin 08/18/2023 14.8  11.7 - 15.5 g/dL Final    Hematocrit 08/18/2023 45.5 (H)  35.0 - 45.0 % Final     MCV 2023 85.2  80.0 - 100.0 fL Final    MCH 2023 27.7  27.0 - 33.0 pg Final    MCHC 2023 32.5  32.0 - 36.0 g/dL Final    RDW 2023 13.5  11.0 - 15.0 % Final    Platelets 2023 336  140 - 400 Thousand/uL Final    MPV 2023 10.2  7.5 - 12.5 fL Final    Neutrophils, Abs 2023 3,783  1,500 - 7,800 cells/uL Final    Lymph # 2023 3,159  850 - 3,900 cells/uL Final    Mono # 2023 523  200 - 950 cells/uL Final    Eos # 2023 273  15 - 500 cells/uL Final    Baso # 2023 62  0 - 200 cells/uL Final    Neutrophils Relative 2023 48.5  % Final    Lymph % 2023 40.5  % Final    Mono % 2023 6.7  % Final    Eosinophil % 2023 3.5  % Final    Basophil % 2023 0.8  % Final    TSH w/reflex to FT4 2023 0.97  mIU/L Final       Past Medical History:   Diagnosis Date    Anemia     Anxiety     Arthritis     Depression     Encounter for blood transfusion 2009    iron deficency    Osteochondroma of right tibia      Social History     Socioeconomic History    Marital status:    Tobacco Use    Smoking status: Never    Smokeless tobacco: Never   Substance and Sexual Activity    Alcohol use: Yes     Comment: socially    Drug use: No     Past Surgical History:   Procedure Laterality Date     SECTION      COLONOSCOPY N/A 2022    Procedure: COLONOSCOPY;  Surgeon: Brian Clements MD;  Location: [facility];  Service: Endoscopy;  Laterality: N/A;    COSMETIC SURGERY Bilateral 2004    Breast implants    GASTRIC BYPASS  2003    HYSTERECTOMY      KNEE SURGERY Right     OsteoChondroma removed     Family History   Problem Relation Age of Onset    Diabetes Mother     No Known Problems Father        Review of patient's allergies indicates:   Allergen Reactions    Codeine Other (See Comments)     nightmares    Trazodone Other (See Comments)     nightmares       Current Outpatient Medications:     cetirizine (ZYRTEC) 10 MG tablet, Take 10  "mg by mouth once daily., Disp: , Rfl:     cyclobenzaprine (FLEXERIL) 10 MG tablet, Take 1 tablet (10 mg total) by mouth 3 (three) times daily as needed for Muscle spasms., Disp: 60 tablet, Rfl: 2    LORazepam (ATIVAN) 1 MG tablet, Take 1 tablet (1 mg total) by mouth every evening., Disp: 30 tablet, Rfl: 2    Review of Systems   Constitutional:  Negative for appetite change, chills, fatigue, fever and unexpected weight change.   HENT:  Negative for congestion.    Respiratory:  Negative for cough, chest tightness and shortness of breath.    Cardiovascular:  Negative for chest pain and palpitations.   Gastrointestinal:  Negative for abdominal distention and abdominal pain.   Endocrine: Negative for cold intolerance and heat intolerance.   Genitourinary:  Negative for decreased urine volume, difficulty urinating, dyspareunia, dysuria, flank pain, frequency, hematuria, pelvic pain and urgency.   Musculoskeletal:  Positive for arthralgias (chronic right knee). Negative for back pain.   Neurological:  Negative for dizziness, weakness and headaches.   Psychiatric/Behavioral:  Positive for dysphoric mood and sleep disturbance. The patient is nervous/anxious.           Objective:      Vitals:    08/21/23 1559 08/21/23 1601 08/21/23 1645   BP: (!) 152/86 (!) 152/90 136/86   Pulse: 80  78   SpO2: 98%     Weight: 95.7 kg (211 lb)     Height: 5' 6" (1.676 m)       Physical Exam  Constitutional:       General: She is not in acute distress.     Appearance: She is well-developed.   HENT:      Head: Normocephalic and atraumatic.   Eyes:      Conjunctiva/sclera: Conjunctivae normal.      Pupils: Pupils are equal, round, and reactive to light.   Neck:      Thyroid: No thyromegaly.   Cardiovascular:      Rate and Rhythm: Normal rate and regular rhythm.      Heart sounds: Normal heart sounds.   Pulmonary:      Effort: Pulmonary effort is normal.      Breath sounds: Normal breath sounds.   Abdominal:      General: Bowel sounds are normal. " There is no distension.      Palpations: Abdomen is soft.      Tenderness: There is no abdominal tenderness.   Musculoskeletal:         General: Normal range of motion.      Cervical back: Normal range of motion and neck supple.   Skin:     General: Skin is warm and dry.      Findings: No erythema.   Neurological:      Mental Status: She is alert and oriented to person, place, and time.      Cranial Nerves: No cranial nerve deficit.   Psychiatric:         Mood and Affect: Mood is anxious. Affect is tearful.         Speech: Speech normal.         Behavior: Behavior normal.         Thought Content: Thought content normal.           Assessment:       1. Encounter for long-term (current) use of other medications    2. Primary insomnia    3. Anxiety    4. Muscle spasm         Plan:       Encounter for long-term (current) use of other medications    Primary insomnia  Comments:  She is doing well with the ativan prn at night. refills as needed.  Orders:  -     LORazepam (ATIVAN) 1 MG tablet; Take 1 tablet (1 mg total) by mouth every evening.  Dispense: 30 tablet; Refill: 2    Anxiety  Comments:  Will increase Ativan to 1 mg strictly p.r.n. for anxiety.  Orders:  -     LORazepam (ATIVAN) 1 MG tablet; Take 1 tablet (1 mg total) by mouth every evening.  Dispense: 30 tablet; Refill: 2    Muscle spasm  Comments:  Refills as needed  Orders:  -     cyclobenzaprine (FLEXERIL) 10 MG tablet; Take 1 tablet (10 mg total) by mouth 3 (three) times daily as needed for Muscle spasms.  Dispense: 60 tablet; Refill: 2      Follow up in about 6 months (around 2/21/2024).        8/21/2023 Luis Alberto Guzman

## 2023-08-22 ENCOUNTER — HOSPITAL ENCOUNTER (OUTPATIENT)
Dept: RADIOLOGY | Facility: HOSPITAL | Age: 53
Discharge: HOME | End: 2023-08-22
Attending: PHYSICIAN ASSISTANT
Payer: COMMERCIAL

## 2023-08-22 DIAGNOSIS — Z12.31 ENCOUNTER FOR SCREENING MAMMOGRAM FOR MALIGNANT NEOPLASM OF BREAST: ICD-10-CM

## 2023-08-22 PROCEDURE — 77067 SCR MAMMO BI INCL CAD: CPT | Mod: TC,PO

## 2023-08-24 ENCOUNTER — TELEPHONE (OUTPATIENT)
Dept: FAMILY MEDICINE | Facility: CLINIC | Age: 53
End: 2023-08-24

## 2023-08-24 DIAGNOSIS — N39.0 URINARY TRACT INFECTION WITHOUT HEMATURIA, SITE UNSPECIFIED: Primary | ICD-10-CM

## 2023-08-24 RX ORDER — CIPROFLOXACIN 500 MG/1
500 TABLET ORAL EVERY 12 HOURS
Qty: 10 TABLET | Refills: 0 | Status: SHIPPED | OUTPATIENT
Start: 2023-08-24 | End: 2024-02-09 | Stop reason: ALTCHOICE

## 2023-08-24 NOTE — TELEPHONE ENCOUNTER
Since this is such a heavy growth of E coli I would go ahead and treat with Cipro 500 p.o. b.i.d. for 5 days.  Also since she is younger patient let us treat this.

## 2023-08-24 NOTE — TELEPHONE ENCOUNTER
Spoke with pt in regards to recent lab results. Verbalized per Luis Alberto that pt's labs all look pretty stable at this time. Pt does have a significant E coli UTI however.  I would recommend treating with Cipro 500 p.o. b.i.d. for 5 days. Pt acknowledged understanding.    Pt did states that she is not having any symptoms of a UTI.     Please advise

## 2023-08-24 NOTE — TELEPHONE ENCOUNTER
----- Message from Luis Alberto Guzman PA-C sent at 8/24/2023  3:13 PM CDT -----  Labs all look pretty stable at this time.  She does have a significant E coli UTI however.  I would recommend treating with Cipro 500 p.o. b.i.d. for 5 days.  If she agrees we can load this to be sent

## 2023-08-24 NOTE — TELEPHONE ENCOUNTER
Spoke with pt in regards to message sent. Verbalized per Gomez, that because it is such a heavy growth of E coli. Gomez would recommend treating with Cipro 500 p.o. b.i.d. for 5 days.  Also since she is younger patient let us treat this. Pt acknowledged understanding.

## 2023-08-29 ENCOUNTER — TELEPHONE (OUTPATIENT)
Dept: FAMILY MEDICINE | Facility: CLINIC | Age: 53
End: 2023-08-29

## 2023-09-11 ENCOUNTER — PATIENT MESSAGE (OUTPATIENT)
Dept: FAMILY MEDICINE | Facility: CLINIC | Age: 53
End: 2023-09-11

## 2023-09-11 RX ORDER — BUTALBITAL, ASPIRIN, AND CAFFEINE 325; 50; 40 MG/1; MG/1; MG/1
1 CAPSULE ORAL EVERY 4 HOURS PRN
Qty: 20 CAPSULE | Refills: 2 | Status: SHIPPED | OUTPATIENT
Start: 2023-09-11 | End: 2023-10-11

## 2023-09-13 ENCOUNTER — TELEPHONE (OUTPATIENT)
Dept: FAMILY MEDICINE | Facility: CLINIC | Age: 53
End: 2023-09-13

## 2023-09-13 NOTE — TELEPHONE ENCOUNTER
Pt states over the last week she has stubbed her pinky toe twice. It is now swollen. States she spoke with Dr. Kendall office and is scheduled for an appt.

## 2023-09-13 NOTE — TELEPHONE ENCOUNTER
----- Message from Cheyanne Saul sent at 9/13/2023  9:57 AM CDT -----  Patient called and stated that she hit her little toe and she need to know if she need a referral to go see Dr. Villalpando [place] the [place] ? Please give her a call back at [phone]

## 2023-09-18 ENCOUNTER — HOSPITAL ENCOUNTER (OUTPATIENT)
Dept: RADIOLOGY | Facility: CLINIC | Age: 53
Discharge: HOME | End: 2023-09-18
Attending: PODIATRIST
Payer: COMMERCIAL

## 2023-09-18 ENCOUNTER — OFFICE VISIT (OUTPATIENT)
Dept: PODIATRY | Facility: CLINIC | Age: 53
End: 2023-09-18
Payer: COMMERCIAL

## 2023-09-18 VITALS — BODY MASS INDEX: 33.38 KG/M2 | HEIGHT: 66 IN | OXYGEN SATURATION: 94 % | WEIGHT: 207.69 LBS | HEART RATE: 75 BPM

## 2023-09-18 DIAGNOSIS — S92.515A NONDISPLACED FRACTURE OF PROXIMAL PHALANX OF LEFT LESSER TOE(S), INITIAL ENCOUNTER FOR CLOSED FRACTURE: Primary | ICD-10-CM

## 2023-09-18 DIAGNOSIS — M79.672 LEFT FOOT PAIN: ICD-10-CM

## 2023-09-18 DIAGNOSIS — M79.675 PAIN OF TOE OF LEFT FOOT: ICD-10-CM

## 2023-09-18 PROCEDURE — 1159F PR MEDICATION LIST DOCUMENTED IN MEDICAL RECORD: ICD-10-PCS | Mod: CPTII,S$GLB,, | Performed by: PODIATRIST

## 2023-09-18 PROCEDURE — 1160F RVW MEDS BY RX/DR IN RCRD: CPT | Mod: CPTII,S$GLB,, | Performed by: PODIATRIST

## 2023-09-18 PROCEDURE — 99999 PR PBB SHADOW E&M-EST. PATIENT-LVL III: CPT | Mod: PBBFAC,,, | Performed by: PODIATRIST

## 2023-09-18 PROCEDURE — 73630 XR FOOT COMPLETE 3 VIEW LEFT: ICD-10-PCS | Mod: LT,S$GLB,, | Performed by: RADIOLOGY

## 2023-09-18 PROCEDURE — 99213 OFFICE O/P EST LOW 20 MIN: CPT | Mod: S$GLB,,, | Performed by: PODIATRIST

## 2023-09-18 PROCEDURE — 73630 X-RAY EXAM OF FOOT: CPT | Mod: LT,S$GLB,, | Performed by: RADIOLOGY

## 2023-09-18 PROCEDURE — 3008F BODY MASS INDEX DOCD: CPT | Mod: CPTII,S$GLB,, | Performed by: PODIATRIST

## 2023-09-18 PROCEDURE — 1159F MED LIST DOCD IN RCRD: CPT | Mod: CPTII,S$GLB,, | Performed by: PODIATRIST

## 2023-09-18 PROCEDURE — 1160F PR REVIEW ALL MEDS BY PRESCRIBER/CLIN PHARMACIST DOCUMENTED: ICD-10-PCS | Mod: CPTII,S$GLB,, | Performed by: PODIATRIST

## 2023-09-18 PROCEDURE — 3008F PR BODY MASS INDEX (BMI) DOCUMENTED: ICD-10-PCS | Mod: CPTII,S$GLB,, | Performed by: PODIATRIST

## 2023-09-18 PROCEDURE — 99999 PR PBB SHADOW E&M-EST. PATIENT-LVL III: ICD-10-PCS | Mod: PBBFAC,,, | Performed by: PODIATRIST

## 2023-09-18 PROCEDURE — 99213 PR OFFICE/OUTPT VISIT, EST, LEVL III, 20-29 MIN: ICD-10-PCS | Mod: S$GLB,,, | Performed by: PODIATRIST

## 2023-09-18 RX ORDER — MILK THISTLE 150 MG
CAPSULE ORAL
COMMUNITY

## 2023-09-18 RX ORDER — HYDROCODONE BITARTRATE AND ACETAMINOPHEN 5; 325 MG/1; MG/1
1 TABLET ORAL EVERY 6 HOURS PRN
Qty: 28 TABLET | Refills: 0 | Status: SHIPPED | OUTPATIENT
Start: 2023-09-18 | End: 2024-02-09 | Stop reason: ALTCHOICE

## 2023-09-18 NOTE — PROGRESS NOTES
"  [address] [facility] [address]  [place] [address]  Phone: [phone]   [phone]    Patient's PCP:Luis Alberto Guzman PA-C  Referring Provider: No ref. provider found    Subjective:      Chief Complaint:: Toe Injury (Left foot 5th toe)    Toe Injury  Associated symptoms include arthralgias and joint swelling. Pertinent negatives include no abdominal pain, chest pain, chills, coughing, fatigue, fever, headaches, myalgias, nausea, numbness, rash or weakness.     Sakshi Horner is a 53 y.o. female who presents today with a complaint of left foot 5th toe injury. The current episode started 2023.  The symptoms include shooting pain , swelling, red and painful . Probable cause of complaint stubbed toe on metal bike rack.  The symptoms are aggravated by pressure on toe, bumping toe. The problem has stayed the same. Treatment to date have included boot cast which provided some relief.         Vitals:    23 1626   Pulse: 75   SpO2: (!) 94%   Weight: 94.2 kg (207 lb 10.8 oz)   Height: 5' 6" (1.676 m)   PainSc:   7      Shoe Size: 9    Past Surgical History:   Procedure Laterality Date     SECTION      COLONOSCOPY N/A 2022    Procedure: COLONOSCOPY;  Surgeon: Brian Clements MD;  Location: [facility];  Service: Endoscopy;  Laterality: N/A;    COSMETIC SURGERY Bilateral     Breast implants    GASTRIC BYPASS      HYSTERECTOMY      KNEE SURGERY Right     OsteoChondroma removed     Past Medical History:   Diagnosis Date    Anemia     Anxiety     Arthritis     Depression     Encounter for blood transfusion 2009    iron deficency    Osteochondroma of right tibia      Family History   Problem Relation Age of Onset    Diabetes Mother     No Known Problems Father         Social History:   Marital Status:   Alcohol History:  reports current alcohol use.  Tobacco History:  reports that she has never smoked. She has never used smokeless tobacco.  Drug History:  reports no " history of drug use.    Review of patient's allergies indicates:   Allergen Reactions    Codeine Other (See Comments)     nightmares    Trazodone Other (See Comments)     nightmares       Current Outpatient Medications   Medication Sig Dispense Refill    butalbital-aspirin-caffeine -40 mg (FIORINAL) -40 mg Cap Take 1 capsule by mouth every 4 (four) hours as needed (headache). 20 capsule 2    cetirizine (ZYRTEC) 10 MG tablet Take 10 mg by mouth once daily.      ciprofloxacin HCl (CIPRO) 500 MG tablet Take 1 tablet (500 mg total) by mouth every 12 (twelve) hours. 10 tablet 0    cyclobenzaprine (FLEXERIL) 10 MG tablet Take 1 tablet (10 mg total) by mouth 3 (three) times daily as needed for Muscle spasms. 60 tablet 2    LORazepam (ATIVAN) 1 MG tablet Take 1 tablet (1 mg total) by mouth every evening. 30 tablet 2    quercetin 500 mg Cap       HYDROcodone-acetaminophen (NORCO) 5-325 mg per tablet Take 1 tablet by mouth every 6 (six) hours as needed for Pain. 28 tablet 0     No current facility-administered medications for this visit.       Review of Systems   Constitutional:  Negative for chills, fatigue, fever and unexpected weight change.   HENT:  Negative for hearing loss and trouble swallowing.    Eyes:  Negative for photophobia and visual disturbance.   Respiratory:  Negative for cough, shortness of breath and wheezing.    Cardiovascular:  Negative for chest pain, palpitations and leg swelling.   Gastrointestinal:  Negative for abdominal pain and nausea.   Genitourinary:  Negative for dysuria and frequency.   Musculoskeletal:  Positive for arthralgias and joint swelling. Negative for back pain and myalgias.   Skin:  Negative for rash and wound.   Neurological:  Negative for tremors, seizures, weakness, numbness and headaches.   Hematological:  Does not bruise/bleed easily.         Objective:        Physical Exam:   Foot Exam    General  General Appearance: appears stated age and healthy   Orientation:  alert and oriented to person, place, and time   Affect: appropriate   Gait: antalgic       Left Foot/Ankle      Inspection and Palpation  Ecchymosis: none  Tenderness: lesser metatarsophalangeal joints (5th toe)  Swelling: lesser metatarsophalangeal joints   Arch: normal  Hammertoes: absent  Claw toes: absent  Hallux valgus: no  Hallux limitus: no  Skin Exam: skin intact; no drainage, no ulcer and no erythema   Neurovascular  Dorsalis pedis: 2+  Posterior tibial: 2+  Capillary refill: 2+  Varicose veins: not present  Saphenous nerve sensation: normal  Tibial nerve sensation: normal  Superficial peroneal nerve sensation: normal  Deep peroneal nerve sensation: normal  Sural nerve sensation: normal    Muscle Strength  Ankle dorsiflexion: 5  Ankle plantar flexion: 5  Ankle inversion: 5  Ankle eversion: 5  Great toe extension: 5  Great toe flexion: 5    Range of Motion    Normal left ankle ROM    Tests  Anterior drawer: negative   Talar tilt: negative   PT Tinel's sign: negative  Paresthesia: negative      Physical Exam  Cardiovascular:      Pulses:           Dorsalis pedis pulses are 2+ on the left side.        Posterior tibial pulses are 2+ on the left side.   Musculoskeletal:      Left foot: No bunion.   Feet:      Left foot:      Skin integrity: No ulcer or erythema.               Left Ankle/Foot Exam     Swelling   The patient is swollen on the lesser metatarsophalangeal joints.    Tenderness   The patient is tender to palpation of the lesser metatarsophalangeal joints.    Range of Motion   The patient has normal left ankle ROM.       Muscle Strength   Left Lower Extremity   Ankle Dorsiflexion:  5   Plantar flexion:  5/5     Vascular Exam       Left Pulses  Dorsalis Pedis:      2+  Posterior Tibial:      2+           Imaging: X-Ray Foot Complete Left  EXAMINATION:  XR FOOT COMPLETE 3 VIEW LEFT    CLINICAL HISTORY:  .  Pain in left foot    TECHNIQUE:  AP, lateral and oblique views of the left foot were  performed.    COMPARISON:  None    FINDINGS:  Remote trauma 5th metatarsal shaft.  Mild bony bunion formation median eminence 1st metatarsal head.  Small calcaneal spur.  Pes planus.  Slight hallux valgus.    Electronically signed by: Luis Alberto Rodas  Date:    09/18/2023  Time:    17:12    On my review of the radiographs there appears to be a subtle nondisplaced fracture proximal phalanx of the 5th toe           Assessment:       1. Nondisplaced fracture of proximal phalanx of left lesser toe(s), initial encounter for closed fracture    2. Left foot pain    3. Pain of toe of left foot      Plan:   Nondisplaced fracture of proximal phalanx of left lesser toe(s), initial encounter for closed fracture    Left foot pain  -     X-Ray Foot Complete Left  -     HYDROcodone-acetaminophen (NORCO) 5-325 mg per tablet; Take 1 tablet by mouth every 6 (six) hours as needed for Pain.  Dispense: 28 tablet; Refill: 0    Pain of toe of left foot      Follow up in about 4 weeks (around 10/16/2023), or if symptoms worsen or fail to improve, for x-ray.    Procedures        I discussed the patient findings of likely fracture of her 5th toe proximal phalanx.  Explained the fortunately this is in good position should heal with conservative management.  Recommend weight-bearing Cam Walker boot for now.  Ice as needed for pain and swelling.  Explained that if her symptoms subside over the next several weeks then she can resume normal activities.  If she continues to have pain 4 weeks from now then she will follow up for further evaluation and x-rays.      Counseling:     I provided patient education verbally regarding:   Patient diagnosis, treatment options, as well as alternatives, risks, and benefits.     I discussed with the pt. the type of fracture and the treatment and time required for healing of the the specific type fracture.      This note was created using Dragon voice recognition software that occasionally misinterpreted phrases  or words.

## 2024-02-09 ENCOUNTER — OFFICE VISIT (OUTPATIENT)
Dept: FAMILY MEDICINE | Facility: CLINIC | Age: 54
End: 2024-02-09
Payer: COMMERCIAL

## 2024-02-09 VITALS
BODY MASS INDEX: 33.75 KG/M2 | WEIGHT: 210 LBS | OXYGEN SATURATION: 99 % | DIASTOLIC BLOOD PRESSURE: 74 MMHG | SYSTOLIC BLOOD PRESSURE: 126 MMHG | HEART RATE: 83 BPM | HEIGHT: 66 IN

## 2024-02-09 DIAGNOSIS — F51.01 PRIMARY INSOMNIA: ICD-10-CM

## 2024-02-09 DIAGNOSIS — Z00.00 ROUTINE HISTORY AND PHYSICAL EXAMINATION OF ADULT: Primary | ICD-10-CM

## 2024-02-09 DIAGNOSIS — F41.9 ANXIETY: ICD-10-CM

## 2024-02-09 PROCEDURE — 1159F MED LIST DOCD IN RCRD: CPT | Mod: CPTII,S$GLB,, | Performed by: PHYSICIAN ASSISTANT

## 2024-02-09 PROCEDURE — 3078F DIAST BP <80 MM HG: CPT | Mod: CPTII,S$GLB,, | Performed by: PHYSICIAN ASSISTANT

## 2024-02-09 PROCEDURE — 3008F BODY MASS INDEX DOCD: CPT | Mod: CPTII,S$GLB,, | Performed by: PHYSICIAN ASSISTANT

## 2024-02-09 PROCEDURE — 3074F SYST BP LT 130 MM HG: CPT | Mod: CPTII,S$GLB,, | Performed by: PHYSICIAN ASSISTANT

## 2024-02-09 PROCEDURE — 99214 OFFICE O/P EST MOD 30 MIN: CPT | Mod: S$GLB,,, | Performed by: PHYSICIAN ASSISTANT

## 2024-02-09 RX ORDER — CYCLOBENZAPRINE HCL 10 MG
10 TABLET ORAL
COMMUNITY
Start: 2024-01-15

## 2024-02-09 RX ORDER — BUTALBITAL, ASPIRIN, AND CAFFEINE 325; 50; 40 MG/1; MG/1; MG/1
1 CAPSULE ORAL EVERY 4 HOURS PRN
COMMUNITY
Start: 2024-01-15

## 2024-02-09 RX ORDER — LORAZEPAM 0.5 MG/1
0.5 TABLET ORAL NIGHTLY
Qty: 30 TABLET | Refills: 2 | Status: SHIPPED | OUTPATIENT
Start: 2024-02-09 | End: 2024-06-04 | Stop reason: SDUPTHER

## 2024-02-09 NOTE — PROGRESS NOTES
SUBJECTIVE:    Patient ID: Sakshi Horner is a 53 y.o. female.    Chief Complaint: Follow-up (No bottles//6 month follow up//discuss ativan side effects)    This is a 52 yo female here for routine check up. She is concerned about weight gain, she read that this is a side effect of Ativan. She is undergoing a lot of life stressors, and Ativan does help. She is going to therapy. Drinking red wine daily now instead of socially. She still has noticed that her hands are tingling, and taking electrolyte water, feels like it helps.  She has noticed that she has increasing hot flashes, night sweats, insomnia, and weight gain. Labs done with OBGYN - per patient, she is not menopausal. He prescribed a testosterone cream, which does not help.    Follow-up  Pertinent negatives include no abdominal pain, arthralgias, chest pain, chills, congestion, coughing, fatigue, fever, headaches or weakness.       Orders Only on 08/18/2023   Component Date Value Ref Range Status    Cholesterol 08/18/2023 180  <200 mg/dL Final    HDL 08/18/2023 56  > OR = 50 mg/dL Final    Triglycerides 08/18/2023 75  <150 mg/dL Final    LDL Cholesterol 08/18/2023 108 (H)  mg/dL (calc) Final    HDL/Cholesterol Ratio 08/18/2023 3.2  <5.0 (calc) Final    Non HDL Chol. (LDL+VLDL) 08/18/2023 124  <130 mg/dL (calc) Final    Glucose 08/18/2023 79  65 - 99 mg/dL Final    BUN 08/18/2023 23  7 - 25 mg/dL Final    Creatinine 08/18/2023 0.98  0.50 - 1.03 mg/dL Final    eGFR 08/18/2023 69  > OR = 60 mL/min/1.73m2 Final    BUN/Creatinine Ratio 08/18/2023 SEE NOTE:  6 - 22 (calc) Final    Sodium 08/18/2023 141  135 - 146 mmol/L Final    Potassium 08/18/2023 4.6  3.5 - 5.3 mmol/L Final    Chloride 08/18/2023 106  98 - 110 mmol/L Final    CO2 08/18/2023 23  20 - 32 mmol/L Final    Calcium 08/18/2023 9.8  8.6 - 10.4 mg/dL Final    Total Protein 08/18/2023 7.1  6.1 - 8.1 g/dL Final    Albumin 08/18/2023 4.5  3.6 - 5.1 g/dL Final    Globulin, Total 08/18/2023 2.6  1.9 - 3.7  g/dL (calc) Final    Albumin/Globulin Ratio 08/18/2023 1.7  1.0 - 2.5 (calc) Final    Total Bilirubin 08/18/2023 0.7  0.2 - 1.2 mg/dL Final    Alkaline Phosphatase 08/18/2023 64  37 - 153 U/L Final    AST 08/18/2023 16  10 - 35 U/L Final    ALT 08/18/2023 15  6 - 29 U/L Final    Color, UA 08/18/2023 YELLOW  YELLOW Final    Appearance, UA 08/18/2023 CLOUDY (A)  CLEAR Final    Specific Gravity, UA 08/18/2023 1.016  1.001 - 1.035 Final    pH, UA 08/18/2023 5.5  5.0 - 8.0 Final    Glucose, UA 08/18/2023 NEGATIVE  NEGATIVE Final    Bilirubin, UA 08/18/2023 NEGATIVE  NEGATIVE Final    Ketones, UA 08/18/2023 NEGATIVE  NEGATIVE Final    Occult Blood UA 08/18/2023 NEGATIVE  NEGATIVE Final    Protein, UA 08/18/2023 NEGATIVE  NEGATIVE Final    Nitrite, UA 08/18/2023 POSITIVE (A)  NEGATIVE Final    Leukocytes, UA 08/18/2023 1+ (A)  NEGATIVE Final    WBC Casts, UA 08/18/2023 0-5  < OR = 5 /HPF Final    RBC Casts, UA 08/18/2023 NONE SEEN  < OR = 2 /HPF Final    Squam Epithel, UA 08/18/2023 0-5  < OR = 5 /HPF Final    Bacteria, UA 08/18/2023 MANY (A)  NONE SEEN /HPF Final    Hyaline Casts, UA 08/18/2023 NONE SEEN  NONE SEEN /LPF Final    Service Cmt: 08/18/2023    Final    Reflexive Urine Culture 08/18/2023    Final    Urine Culture, Routine 08/18/2023  (A)   Final    WBC 08/18/2023 7.8  3.8 - 10.8 Thousand/uL Final    RBC 08/18/2023 5.34 (H)  3.80 - 5.10 Million/uL Final    Hemoglobin 08/18/2023 14.8  11.7 - 15.5 g/dL Final    Hematocrit 08/18/2023 45.5 (H)  35.0 - 45.0 % Final    MCV 08/18/2023 85.2  80.0 - 100.0 fL Final    MCH 08/18/2023 27.7  27.0 - 33.0 pg Final    MCHC 08/18/2023 32.5  32.0 - 36.0 g/dL Final    RDW 08/18/2023 13.5  11.0 - 15.0 % Final    Platelets 08/18/2023 336  140 - 400 Thousand/uL Final    MPV 08/18/2023 10.2  7.5 - 12.5 fL Final    Neutrophils, Abs 08/18/2023 3,783  1,500 - 7,800 cells/uL Final    Lymph # 08/18/2023 3,159  850 - 3,900 cells/uL Final    Mono # 08/18/2023 523  200 - 950 cells/uL Final     Eos # 2023 273  15 - 500 cells/uL Final    Baso # 2023 62  0 - 200 cells/uL Final    Neutrophils Relative 2023 48.5  % Final    Lymph % 2023 40.5  % Final    Mono % 2023 6.7  % Final    Eosinophil % 2023 3.5  % Final    Basophil % 2023 0.8  % Final    TSH w/reflex to FT4 2023 0.97  mIU/L Final       Past Medical History:   Diagnosis Date    Anemia     Anxiety     Arthritis     Depression     Encounter for blood transfusion 2009    iron deficency    Osteochondroma of right tibia      Past Surgical History:   Procedure Laterality Date     SECTION      COLONOSCOPY N/A 2022    Procedure: COLONOSCOPY;  Surgeon: Brian Clements MD;  Location: [facility];  Service: Endoscopy;  Laterality: N/A;    COSMETIC SURGERY Bilateral 2004    Breast implants    GASTRIC BYPASS      HYSTERECTOMY      KNEE SURGERY Right     OsteoChondroma removed     Family History   Problem Relation Age of Onset    Diabetes Mother     No Known Problems Father        Marital Status:   Alcohol History:  reports current alcohol use.  Tobacco History:  reports that she has never smoked. She has never used smokeless tobacco.  Drug History:  reports no history of drug use.    Review of patient's allergies indicates:   Allergen Reactions    Codeine Other (See Comments)     nightmares    Trazodone Other (See Comments)     nightmares       Current Outpatient Medications:     butalbital-aspirin-caffeine -40 mg (FIORINAL) -40 mg Cap, Take 1 capsule by mouth every 4 (four) hours as needed., Disp: , Rfl:     cetirizine (ZYRTEC) 10 MG tablet, Take 10 mg by mouth once daily., Disp: , Rfl:     cyclobenzaprine (FLEXERIL) 10 MG tablet, Take 10 mg by mouth., Disp: , Rfl:     LORazepam (ATIVAN) 0.5 MG tablet, Take 1 tablet (0.5 mg total) by mouth every evening., Disp: 30 tablet, Rfl: 2    quercetin 500 mg Cap, , Disp: , Rfl:     Review of Systems   Constitutional:  Negative for  "activity change, chills, fatigue and fever.   HENT:  Negative for congestion.    Respiratory:  Negative for apnea, cough and shortness of breath.    Cardiovascular:  Negative for chest pain and palpitations.   Gastrointestinal:  Negative for abdominal distention, abdominal pain, constipation and diarrhea.   Genitourinary:  Negative for difficulty urinating.   Musculoskeletal:  Negative for arthralgias.   Neurological:  Negative for weakness and headaches.          Objective:      Vitals:    02/09/24 0724   BP: 126/74   Pulse: 83   SpO2: 99%   Weight: 95.3 kg (210 lb)   Height: 5' 6" (1.676 m)     Physical Exam  Constitutional:       Appearance: Normal appearance.   HENT:      Head: Normocephalic and atraumatic.   Eyes:      Extraocular Movements: Extraocular movements intact.      Pupils: Pupils are equal, round, and reactive to light.   Cardiovascular:      Rate and Rhythm: Normal rate and regular rhythm.      Pulses: Normal pulses.   Pulmonary:      Effort: Pulmonary effort is normal.      Breath sounds: Normal breath sounds.   Abdominal:      General: Abdomen is flat.      Palpations: Abdomen is soft.   Skin:     General: Skin is warm and dry.      Capillary Refill: Capillary refill takes less than 2 seconds.   Neurological:      Mental Status: She is alert.           Assessment:       1. Routine history and physical examination of adult    2. Anxiety    3. Primary insomnia         Plan:       Routine history and physical examination of adult  Comments:  Labs reviewed from 8/2023. Mammo normal 8/2023.    Anxiety  Comments:  Going to therapy and taking ativan prn. discussed reliance on alcohol also. reduce ativan to 0.5mg now.  Orders:  -     LORazepam (ATIVAN) 0.5 MG tablet; Take 1 tablet (0.5 mg total) by mouth every evening.  Dispense: 30 tablet; Refill: 2    Primary insomnia  Comments:  She is doing well with the ativan prn at night. refills as needed.  Orders:  -     LORazepam (ATIVAN) 0.5 MG tablet; Take 1 " tablet (0.5 mg total) by mouth every evening.  Dispense: 30 tablet; Refill: 2      Follow up in about 6 months (around 8/9/2024).        2/9/2024 Luis Alberto Guzman PA-C

## 2024-06-04 DIAGNOSIS — F41.9 ANXIETY: ICD-10-CM

## 2024-06-04 DIAGNOSIS — F51.01 PRIMARY INSOMNIA: ICD-10-CM

## 2024-06-05 RX ORDER — LORAZEPAM 0.5 MG/1
0.5 TABLET ORAL NIGHTLY
Qty: 30 TABLET | Refills: 2 | Status: SHIPPED | OUTPATIENT
Start: 2024-06-05 | End: 2024-09-03

## 2024-06-28 DIAGNOSIS — F41.9 ANXIETY: Primary | ICD-10-CM

## 2024-06-28 RX ORDER — ESCITALOPRAM OXALATE 5 MG/1
5 TABLET ORAL DAILY
Qty: 30 TABLET | Refills: 1 | Status: SHIPPED | OUTPATIENT
Start: 2024-06-28 | End: 2025-06-28

## 2024-06-28 NOTE — TELEPHONE ENCOUNTER
Message from appt request:  I am severely depressed, having panic attacks frequently. I don't know what to do?  I am seeing a therapist.  Not sure if Luis Alberto can help     Spoke to pt, only takes ativan 0.5mg @ night to help sleep-next appt is 7/17/24

## 2024-06-28 NOTE — TELEPHONE ENCOUNTER
We could start low dose SSRI and followup as scheduled. May suggest lexapro 5mg daily to increase at next visit if needed.

## 2024-07-17 ENCOUNTER — OFFICE VISIT (OUTPATIENT)
Dept: FAMILY MEDICINE | Facility: CLINIC | Age: 54
End: 2024-07-17
Payer: COMMERCIAL

## 2024-07-17 VITALS
HEIGHT: 66 IN | RESPIRATION RATE: 18 BRPM | WEIGHT: 207 LBS | DIASTOLIC BLOOD PRESSURE: 71 MMHG | BODY MASS INDEX: 33.27 KG/M2 | SYSTOLIC BLOOD PRESSURE: 118 MMHG | HEART RATE: 85 BPM | OXYGEN SATURATION: 98 %

## 2024-07-17 DIAGNOSIS — F41.9 ANXIETY: Primary | ICD-10-CM

## 2024-07-17 DIAGNOSIS — G44.209 TENSION HEADACHE: ICD-10-CM

## 2024-07-17 DIAGNOSIS — F51.01 PRIMARY INSOMNIA: ICD-10-CM

## 2024-07-17 PROCEDURE — 1159F MED LIST DOCD IN RCRD: CPT | Mod: CPTII,S$GLB,, | Performed by: PHYSICIAN ASSISTANT

## 2024-07-17 PROCEDURE — 3008F BODY MASS INDEX DOCD: CPT | Mod: CPTII,S$GLB,, | Performed by: PHYSICIAN ASSISTANT

## 2024-07-17 PROCEDURE — 3078F DIAST BP <80 MM HG: CPT | Mod: CPTII,S$GLB,, | Performed by: PHYSICIAN ASSISTANT

## 2024-07-17 PROCEDURE — 99214 OFFICE O/P EST MOD 30 MIN: CPT | Mod: S$GLB,,, | Performed by: PHYSICIAN ASSISTANT

## 2024-07-17 PROCEDURE — 3074F SYST BP LT 130 MM HG: CPT | Mod: CPTII,S$GLB,, | Performed by: PHYSICIAN ASSISTANT

## 2024-07-17 RX ORDER — LORAZEPAM 0.5 MG/1
0.5 TABLET ORAL 2 TIMES DAILY PRN
Qty: 60 TABLET | Refills: 2 | Status: SHIPPED | OUTPATIENT
Start: 2024-07-17 | End: 2024-10-15

## 2024-07-17 RX ORDER — ESCITALOPRAM OXALATE 10 MG/1
10 TABLET ORAL DAILY
Qty: 30 TABLET | Refills: 1 | Status: SHIPPED | OUTPATIENT
Start: 2024-07-17 | End: 2025-07-17

## 2024-07-17 RX ORDER — BUTALBITAL, ASPIRIN, AND CAFFEINE 325; 50; 40 MG/1; MG/1; MG/1
1 CAPSULE ORAL EVERY 4 HOURS PRN
Qty: 30 CAPSULE | Refills: 2 | Status: SHIPPED | OUTPATIENT
Start: 2024-07-17 | End: 2024-10-15

## 2024-07-17 NOTE — PROGRESS NOTES
SUBJECTIVE:    Patient ID: Sakshi Horner is a 54 y.o. female.    Chief Complaint: Follow-up (No bottles// refills needed//pt states she's been experiencing anxiety and panic attacks which is making her bp high// pt is dealing with a lot of family issues )    This is a 53 yo female who presents for regular checkup and refills. She reports that she has been dealing with a great deal more stress lately than she has been. Panic anxiety comes in waves. May happen even at night when she is just laying in bed. Still seeing therapist regularly and was recently started on daily ssri (lexapro). Thinks that maybe the anxiety is slightly worse.         No visits with results within 6 Month(s) from this visit.   Latest known visit with results is:   Orders Only on 08/18/2023   Component Date Value Ref Range Status    Cholesterol 08/18/2023 180  <200 mg/dL Final    HDL 08/18/2023 56  > OR = 50 mg/dL Final    Triglycerides 08/18/2023 75  <150 mg/dL Final    LDL Cholesterol 08/18/2023 108 (H)  mg/dL (calc) Final    HDL/Cholesterol Ratio 08/18/2023 3.2  <5.0 (calc) Final    Non HDL Chol. (LDL+VLDL) 08/18/2023 124  <130 mg/dL (calc) Final    Glucose 08/18/2023 79  65 - 99 mg/dL Final    BUN 08/18/2023 23  7 - 25 mg/dL Final    Creatinine 08/18/2023 0.98  0.50 - 1.03 mg/dL Final    eGFR 08/18/2023 69  > OR = 60 mL/min/1.73m2 Final    BUN/Creatinine Ratio 08/18/2023 SEE NOTE:  6 - 22 (calc) Final    Sodium 08/18/2023 141  135 - 146 mmol/L Final    Potassium 08/18/2023 4.6  3.5 - 5.3 mmol/L Final    Chloride 08/18/2023 106  98 - 110 mmol/L Final    CO2 08/18/2023 23  20 - 32 mmol/L Final    Calcium 08/18/2023 9.8  8.6 - 10.4 mg/dL Final    Total Protein 08/18/2023 7.1  6.1 - 8.1 g/dL Final    Albumin 08/18/2023 4.5  3.6 - 5.1 g/dL Final    Globulin, Total 08/18/2023 2.6  1.9 - 3.7 g/dL (calc) Final    Albumin/Globulin Ratio 08/18/2023 1.7  1.0 - 2.5 (calc) Final    Total Bilirubin 08/18/2023 0.7  0.2 - 1.2 mg/dL Final    Alkaline  Bed: P2  Expected date:   Expected time:   Means of arrival:   Comments:  31   Phosphatase 08/18/2023 64  37 - 153 U/L Final    AST 08/18/2023 16  10 - 35 U/L Final    ALT 08/18/2023 15  6 - 29 U/L Final    Color, UA 08/18/2023 YELLOW  YELLOW Final    Appearance, UA 08/18/2023 CLOUDY (A)  CLEAR Final    Specific Gravity, UA 08/18/2023 1.016  1.001 - 1.035 Final    pH, UA 08/18/2023 5.5  5.0 - 8.0 Final    Glucose, UA 08/18/2023 NEGATIVE  NEGATIVE Final    Bilirubin, UA 08/18/2023 NEGATIVE  NEGATIVE Final    Ketones, UA 08/18/2023 NEGATIVE  NEGATIVE Final    Occult Blood UA 08/18/2023 NEGATIVE  NEGATIVE Final    Protein, UA 08/18/2023 NEGATIVE  NEGATIVE Final    Nitrite, UA 08/18/2023 POSITIVE (A)  NEGATIVE Final    Leukocytes, UA 08/18/2023 1+ (A)  NEGATIVE Final    WBC Casts, UA 08/18/2023 0-5  < OR = 5 /HPF Final    RBC Casts, UA 08/18/2023 NONE SEEN  < OR = 2 /HPF Final    Squam Epithel, UA 08/18/2023 0-5  < OR = 5 /HPF Final    Bacteria, UA 08/18/2023 MANY (A)  NONE SEEN /HPF Final    Hyaline Casts, UA 08/18/2023 NONE SEEN  NONE SEEN /LPF Final    Service Cmt: 08/18/2023    Final    Reflexive Urine Culture 08/18/2023    Final    Urine Culture, Routine 08/18/2023  (A)   Final    WBC 08/18/2023 7.8  3.8 - 10.8 Thousand/uL Final    RBC 08/18/2023 5.34 (H)  3.80 - 5.10 Million/uL Final    Hemoglobin 08/18/2023 14.8  11.7 - 15.5 g/dL Final    Hematocrit 08/18/2023 45.5 (H)  35.0 - 45.0 % Final    MCV 08/18/2023 85.2  80.0 - 100.0 fL Final    MCH 08/18/2023 27.7  27.0 - 33.0 pg Final    MCHC 08/18/2023 32.5  32.0 - 36.0 g/dL Final    RDW 08/18/2023 13.5  11.0 - 15.0 % Final    Platelets 08/18/2023 336  140 - 400 Thousand/uL Final    MPV 08/18/2023 10.2  7.5 - 12.5 fL Final    Neutrophils, Abs 08/18/2023 3,783  1,500 - 7,800 cells/uL Final    Lymph # 08/18/2023 3,159  850 - 3,900 cells/uL Final    Mono # 08/18/2023 523  200 - 950 cells/uL Final    Eos # 08/18/2023 273  15 - 500 cells/uL Final    Baso # 08/18/2023 62  0 - 200 cells/uL Final    Neutrophils Relative 08/18/2023 48.5  % Final    Lymph  % 2023 40.5  % Final    Mono % 2023 6.7  % Final    Eosinophil % 2023 3.5  % Final    Basophil % 2023 0.8  % Final    TSH w/reflex to FT4 2023 0.97  mIU/L Final       Past Medical History:   Diagnosis Date    Anemia     Anxiety     Arthritis     Depression     Encounter for blood transfusion 2009    iron deficency    Osteochondroma of right tibia      Past Surgical History:   Procedure Laterality Date     SECTION      COLONOSCOPY N/A 2022    Procedure: COLONOSCOPY;  Surgeon: Brian Clements MD;  Location: [facility];  Service: Endoscopy;  Laterality: N/A;    COSMETIC SURGERY Bilateral     Breast implants    GASTRIC BYPASS      HYSTERECTOMY      KNEE SURGERY Right     OsteoChondroma removed     Family History   Problem Relation Name Age of Onset    Diabetes Mother      No Known Problems Father         Marital Status:   Alcohol History:  reports current alcohol use.  Tobacco History:  reports that she has never smoked. She has never used smokeless tobacco.  Drug History:  reports no history of drug use.    Review of patient's allergies indicates:   Allergen Reactions    Codeine Other (See Comments)     nightmares    Trazodone Other (See Comments)     nightmares       Current Outpatient Medications:     cetirizine (ZYRTEC) 10 MG tablet, Take 10 mg by mouth once daily., Disp: , Rfl:     cyclobenzaprine (FLEXERIL) 10 MG tablet, Take 10 mg by mouth., Disp: , Rfl:     butalbital-aspirin-caffeine -40 mg (FIORINAL) -40 mg Cap, Take 1 capsule by mouth every 4 (four) hours as needed., Disp: 30 capsule, Rfl: 2    EScitalopram oxalate (LEXAPRO) 10 MG tablet, Take 1 tablet (10 mg total) by mouth once daily., Disp: 30 tablet, Rfl: 1    LORazepam (ATIVAN) 0.5 MG tablet, Take 1 tablet (0.5 mg total) by mouth 2 (two) times daily as needed for Anxiety., Disp: 60 tablet, Rfl: 2    quercetin 500 mg Cap, , Disp: , Rfl:     Review of Systems   Constitutional:  " Negative for activity change and unexpected weight change.   HENT:  Negative for hearing loss, rhinorrhea and trouble swallowing.    Eyes:  Negative for discharge and visual disturbance.   Respiratory:  Negative for chest tightness and wheezing.    Cardiovascular:  Negative for chest pain and palpitations.   Gastrointestinal:  Negative for blood in stool, constipation, diarrhea and vomiting.   Endocrine: Negative for polydipsia and polyuria.   Genitourinary:  Negative for difficulty urinating, dysuria, hematuria and menstrual problem.   Musculoskeletal:  Positive for arthralgias. Negative for joint swelling and neck pain.   Neurological:  Positive for headaches. Negative for weakness.   Psychiatric/Behavioral:  Positive for dysphoric mood. Negative for confusion.           Objective:      Vitals:    07/17/24 0712   BP: 118/71   Pulse: 85   Resp: 18   SpO2: 98%   Weight: 93.9 kg (207 lb)   Height: 5' 6" (1.676 m)     Physical Exam  Constitutional:       Appearance: Normal appearance.   HENT:      Head: Normocephalic and atraumatic.   Eyes:      Extraocular Movements: Extraocular movements intact.      Pupils: Pupils are equal, round, and reactive to light.   Cardiovascular:      Rate and Rhythm: Normal rate and regular rhythm.      Pulses: Normal pulses.   Pulmonary:      Effort: Pulmonary effort is normal.      Breath sounds: Normal breath sounds.   Abdominal:      General: Abdomen is flat.      Palpations: Abdomen is soft.   Skin:     General: Skin is warm and dry.      Capillary Refill: Capillary refill takes less than 2 seconds.   Neurological:      Mental Status: She is alert.           Assessment:       1. Anxiety    2. Tension headache    3. Primary insomnia         Plan:       Anxiety  Comments:  Going to therapy and taking ativan prn. increase lexapro to 10mg and will make ativan available BID prn for acute anxiety  Orders:  -     LORazepam (ATIVAN) 0.5 MG tablet; Take 1 tablet (0.5 mg total) by mouth 2 " (two) times daily as needed for Anxiety.  Dispense: 60 tablet; Refill: 2  -     EScitalopram oxalate (LEXAPRO) 10 MG tablet; Take 1 tablet (10 mg total) by mouth once daily.  Dispense: 30 tablet; Refill: 1    Tension headache  Comments:  refills as needed.  Orders:  -     butalbital-aspirin-caffeine -40 mg (FIORINAL) -40 mg Cap; Take 1 capsule by mouth every 4 (four) hours as needed.  Dispense: 30 capsule; Refill: 2    Primary insomnia  Comments:  She is doing well with the ativan prn at night. refills as needed.  Orders:  -     LORazepam (ATIVAN) 0.5 MG tablet; Take 1 tablet (0.5 mg total) by mouth 2 (two) times daily as needed for Anxiety.  Dispense: 60 tablet; Refill: 2      Follow up in about 3 months (around 10/17/2024).        7/17/2024 Luis Alberto Guzman PA-C

## 2024-08-28 DIAGNOSIS — Z12.31 OTHER SCREENING MAMMOGRAM: ICD-10-CM

## 2024-09-04 ENCOUNTER — PATIENT MESSAGE (OUTPATIENT)
Dept: ADMINISTRATIVE | Facility: HOSPITAL | Age: 54
End: 2024-09-04
Payer: COMMERCIAL

## 2024-09-05 ENCOUNTER — HOSPITAL ENCOUNTER (OUTPATIENT)
Dept: RADIOLOGY | Facility: HOSPITAL | Age: 54
Discharge: HOME | End: 2024-09-05
Attending: PHYSICIAN ASSISTANT
Payer: COMMERCIAL

## 2024-09-05 DIAGNOSIS — Z12.31 OTHER SCREENING MAMMOGRAM: ICD-10-CM

## 2024-09-05 PROCEDURE — 77067 SCR MAMMO BI INCL CAD: CPT | Mod: TC,PO

## 2024-09-05 PROCEDURE — 77067 SCR MAMMO BI INCL CAD: CPT | Mod: 26,,, | Performed by: RADIOLOGY

## 2024-09-05 PROCEDURE — 77063 BREAST TOMOSYNTHESIS BI: CPT | Mod: 26,,, | Performed by: RADIOLOGY

## 2024-09-06 DIAGNOSIS — F41.9 ANXIETY: ICD-10-CM

## 2024-09-09 RX ORDER — ESCITALOPRAM OXALATE 10 MG/1
10 TABLET ORAL DAILY
Qty: 30 TABLET | Refills: 1 | Status: SHIPPED | OUTPATIENT
Start: 2024-09-09 | End: 2025-09-09

## 2024-10-15 ENCOUNTER — OFFICE VISIT (OUTPATIENT)
Dept: FAMILY MEDICINE | Facility: CLINIC | Age: 54
End: 2024-10-15
Payer: COMMERCIAL

## 2024-10-15 VITALS
HEIGHT: 66 IN | WEIGHT: 210 LBS | OXYGEN SATURATION: 97 % | SYSTOLIC BLOOD PRESSURE: 110 MMHG | HEART RATE: 94 BPM | DIASTOLIC BLOOD PRESSURE: 64 MMHG | BODY MASS INDEX: 33.75 KG/M2 | RESPIRATION RATE: 18 BRPM

## 2024-10-15 DIAGNOSIS — F51.01 PRIMARY INSOMNIA: ICD-10-CM

## 2024-10-15 DIAGNOSIS — F41.9 ANXIETY: ICD-10-CM

## 2024-10-15 PROCEDURE — 3078F DIAST BP <80 MM HG: CPT | Mod: CPTII,S$GLB,, | Performed by: PHYSICIAN ASSISTANT

## 2024-10-15 PROCEDURE — 99214 OFFICE O/P EST MOD 30 MIN: CPT | Mod: S$GLB,,, | Performed by: PHYSICIAN ASSISTANT

## 2024-10-15 PROCEDURE — 1159F MED LIST DOCD IN RCRD: CPT | Mod: CPTII,S$GLB,, | Performed by: PHYSICIAN ASSISTANT

## 2024-10-15 PROCEDURE — 3074F SYST BP LT 130 MM HG: CPT | Mod: CPTII,S$GLB,, | Performed by: PHYSICIAN ASSISTANT

## 2024-10-15 PROCEDURE — 3008F BODY MASS INDEX DOCD: CPT | Mod: CPTII,S$GLB,, | Performed by: PHYSICIAN ASSISTANT

## 2024-10-15 RX ORDER — ESCITALOPRAM OXALATE 10 MG/1
10 TABLET ORAL DAILY
Qty: 90 TABLET | Refills: 1 | Status: SHIPPED | OUTPATIENT
Start: 2024-10-15 | End: 2025-04-13

## 2024-10-15 RX ORDER — LORAZEPAM 0.5 MG/1
0.5 TABLET ORAL 2 TIMES DAILY PRN
Qty: 60 TABLET | Refills: 2 | Status: SHIPPED | OUTPATIENT
Start: 2024-10-15 | End: 2025-01-13

## 2024-10-15 NOTE — PROGRESS NOTES
SUBJECTIVE:    Patient ID: Sakshi Horner is a 54 y.o. female.    Chief Complaint: Follow-up (No bottles// refills needed// pt states she's been having stomach ache, diarrhea and vomiting going on for 3 days says today she feel a little better //pt refused flu vaccine )    This is a 54 year old female who presents today for 3 month followup. She is getting over a stomach virus now. Has been trying to push fluids and stay hydrated. She does feel that the lexapro is helping overall. Recently increased to 10mg. Struggling with some family issues and working through. She continues to see her therapist- Eusebia Sharpe. Really happy and feeling that everything is going well at this time. Possibly early into menopause. Some hot flashes and will be seeing GYN soon.    Follow-up  Associated symptoms include arthralgias. Pertinent negatives include no chest pain, headaches, joint swelling, neck pain, vomiting or weakness.       No visits with results within 6 Month(s) from this visit.   Latest known visit with results is:   Orders Only on 08/18/2023   Component Date Value Ref Range Status    Cholesterol 08/18/2023 180  <200 mg/dL Final    HDL 08/18/2023 56  > OR = 50 mg/dL Final    Triglycerides 08/18/2023 75  <150 mg/dL Final    LDL Cholesterol 08/18/2023 108 (H)  mg/dL (calc) Final    HDL/Cholesterol Ratio 08/18/2023 3.2  <5.0 (calc) Final    Non HDL Chol. (LDL+VLDL) 08/18/2023 124  <130 mg/dL (calc) Final    Glucose 08/18/2023 79  65 - 99 mg/dL Final    BUN 08/18/2023 23  7 - 25 mg/dL Final    Creatinine 08/18/2023 0.98  0.50 - 1.03 mg/dL Final    eGFR 08/18/2023 69  > OR = 60 mL/min/1.73m2 Final    BUN/Creatinine Ratio 08/18/2023 SEE NOTE:  6 - 22 (calc) Final    Sodium 08/18/2023 141  135 - 146 mmol/L Final    Potassium 08/18/2023 4.6  3.5 - 5.3 mmol/L Final    Chloride 08/18/2023 106  98 - 110 mmol/L Final    CO2 08/18/2023 23  20 - 32 mmol/L Final    Calcium 08/18/2023 9.8  8.6 - 10.4 mg/dL Final    Total Protein 08/18/2023  7.1  6.1 - 8.1 g/dL Final    Albumin 08/18/2023 4.5  3.6 - 5.1 g/dL Final    Globulin, Total 08/18/2023 2.6  1.9 - 3.7 g/dL (calc) Final    Albumin/Globulin Ratio 08/18/2023 1.7  1.0 - 2.5 (calc) Final    Total Bilirubin 08/18/2023 0.7  0.2 - 1.2 mg/dL Final    Alkaline Phosphatase 08/18/2023 64  37 - 153 U/L Final    AST 08/18/2023 16  10 - 35 U/L Final    ALT 08/18/2023 15  6 - 29 U/L Final    Color, UA 08/18/2023 YELLOW  YELLOW Final    Appearance, UA 08/18/2023 CLOUDY (A)  CLEAR Final    Specific Gravity, UA 08/18/2023 1.016  1.001 - 1.035 Final    pH, UA 08/18/2023 5.5  5.0 - 8.0 Final    Glucose, UA 08/18/2023 NEGATIVE  NEGATIVE Final    Bilirubin, UA 08/18/2023 NEGATIVE  NEGATIVE Final    Ketones, UA 08/18/2023 NEGATIVE  NEGATIVE Final    Occult Blood UA 08/18/2023 NEGATIVE  NEGATIVE Final    Protein, UA 08/18/2023 NEGATIVE  NEGATIVE Final    Nitrite, UA 08/18/2023 POSITIVE (A)  NEGATIVE Final    Leukocytes, UA 08/18/2023 1+ (A)  NEGATIVE Final    WBC Casts, UA 08/18/2023 0-5  < OR = 5 /HPF Final    RBC Casts, UA 08/18/2023 NONE SEEN  < OR = 2 /HPF Final    Squam Epithel, UA 08/18/2023 0-5  < OR = 5 /HPF Final    Bacteria, UA 08/18/2023 MANY (A)  NONE SEEN /HPF Final    Hyaline Casts, UA 08/18/2023 NONE SEEN  NONE SEEN /LPF Final    Service Cmt: 08/18/2023    Final    Reflexive Urine Culture 08/18/2023    Final    Urine Culture, Routine 08/18/2023  (A)   Final    WBC 08/18/2023 7.8  3.8 - 10.8 Thousand/uL Final    RBC 08/18/2023 5.34 (H)  3.80 - 5.10 Million/uL Final    Hemoglobin 08/18/2023 14.8  11.7 - 15.5 g/dL Final    Hematocrit 08/18/2023 45.5 (H)  35.0 - 45.0 % Final    MCV 08/18/2023 85.2  80.0 - 100.0 fL Final    MCH 08/18/2023 27.7  27.0 - 33.0 pg Final    MCHC 08/18/2023 32.5  32.0 - 36.0 g/dL Final    RDW 08/18/2023 13.5  11.0 - 15.0 % Final    Platelets 08/18/2023 336  140 - 400 Thousand/uL Final    MPV 08/18/2023 10.2  7.5 - 12.5 fL Final    Neutrophils, Abs 08/18/2023 3,783  1,500 - 7,800  cells/uL Final    Lymph # 2023 3,159  850 - 3,900 cells/uL Final    Mono # 2023 523  200 - 950 cells/uL Final    Eos # 2023 273  15 - 500 cells/uL Final    Baso # 2023 62  0 - 200 cells/uL Final    Neutrophils Relative 2023 48.5  % Final    Lymph % 2023 40.5  % Final    Mono % 2023 6.7  % Final    Eosinophil % 2023 3.5  % Final    Basophil % 2023 0.8  % Final    TSH w/reflex to FT4 2023 0.97  mIU/L Final       Past Medical History:   Diagnosis Date    Anemia     Anxiety     Arthritis     Depression     Encounter for blood transfusion 2009    iron deficency    Osteochondroma of right tibia      Past Surgical History:   Procedure Laterality Date     SECTION      COLONOSCOPY N/A 2022    Procedure: COLONOSCOPY;  Surgeon: Brian Clements MD;  Location: [facility];  Service: Endoscopy;  Laterality: N/A;    COSMETIC SURGERY Bilateral     Breast implants    GASTRIC BYPASS      HYSTERECTOMY      KNEE SURGERY Right     OsteoChondroma removed     Family History   Problem Relation Name Age of Onset    Diabetes Mother      No Known Problems Father         Marital Status:   Alcohol History:  reports current alcohol use.  Tobacco History:  reports that she has never smoked. She has never used smokeless tobacco.  Drug History:  reports no history of drug use.    Review of patient's allergies indicates:   Allergen Reactions    Codeine Other (See Comments)     nightmares    Trazodone Other (See Comments)     nightmares       Current Outpatient Medications:     butalbital-aspirin-caffeine -40 mg (FIORINAL) -40 mg Cap, Take 1 capsule by mouth every 4 (four) hours as needed., Disp: 30 capsule, Rfl: 2    cetirizine (ZYRTEC) 10 MG tablet, Take 10 mg by mouth once daily., Disp: , Rfl:     cyclobenzaprine (FLEXERIL) 10 MG tablet, Take 10 mg by mouth., Disp: , Rfl:     EScitalopram oxalate (LEXAPRO) 10 MG tablet, Take 1 tablet (10  "mg total) by mouth once daily., Disp: 90 tablet, Rfl: 1    LORazepam (ATIVAN) 0.5 MG tablet, Take 1 tablet (0.5 mg total) by mouth 2 (two) times daily as needed for Anxiety., Disp: 60 tablet, Rfl: 2    Review of Systems   Constitutional:  Positive for unexpected weight change. Negative for activity change.   HENT:  Negative for hearing loss, rhinorrhea and trouble swallowing.    Eyes:  Negative for discharge and visual disturbance.   Respiratory:  Negative for chest tightness and wheezing.    Cardiovascular:  Negative for chest pain and palpitations.   Gastrointestinal:  Negative for blood in stool, constipation, diarrhea and vomiting.   Endocrine: Negative for polydipsia and polyuria.   Genitourinary:  Negative for difficulty urinating, dysuria, hematuria and menstrual problem.   Musculoskeletal:  Positive for arthralgias. Negative for joint swelling and neck pain.   Neurological:  Negative for weakness and headaches.   Psychiatric/Behavioral:  Negative for confusion and dysphoric mood.           Objective:      Vitals:    10/15/24 0709   BP: 110/64   Pulse: 94   Resp: 18   SpO2: 97%   Weight: 95.3 kg (210 lb)   Height: 5' 6" (1.676 m)     Physical Exam  Constitutional:       Appearance: Normal appearance.   HENT:      Head: Normocephalic and atraumatic.   Eyes:      Extraocular Movements: Extraocular movements intact.      Pupils: Pupils are equal, round, and reactive to light.   Cardiovascular:      Rate and Rhythm: Normal rate and regular rhythm.      Pulses: Normal pulses.   Pulmonary:      Effort: Pulmonary effort is normal.      Breath sounds: Normal breath sounds.   Abdominal:      General: Abdomen is flat.      Palpations: Abdomen is soft.   Skin:     General: Skin is warm and dry.      Capillary Refill: Capillary refill takes less than 2 seconds.   Neurological:      Mental Status: She is alert.           Assessment:       1. Anxiety    2. Primary insomnia         Plan:       Anxiety  Comments:  Going to " therapy and taking ativan prn. very happy with the lexapro at the current dose. continue as is.  Orders:  -     EScitalopram oxalate (LEXAPRO) 10 MG tablet; Take 1 tablet (10 mg total) by mouth once daily.  Dispense: 90 tablet; Refill: 1  -     LORazepam (ATIVAN) 0.5 MG tablet; Take 1 tablet (0.5 mg total) by mouth 2 (two) times daily as needed for Anxiety.  Dispense: 60 tablet; Refill: 2    Primary insomnia  Comments:  She is doing well with the ativan prn at night. refills as needed.  Orders:  -     LORazepam (ATIVAN) 0.5 MG tablet; Take 1 tablet (0.5 mg total) by mouth 2 (two) times daily as needed for Anxiety.  Dispense: 60 tablet; Refill: 2      Follow up in about 6 months (around 4/15/2025).        10/15/2024 Luis Alberto Guzman PA-C

## 2025-02-04 ENCOUNTER — PATIENT MESSAGE (OUTPATIENT)
Dept: FAMILY MEDICINE | Facility: CLINIC | Age: 55
End: 2025-02-04
Payer: COMMERCIAL

## 2025-04-15 ENCOUNTER — OFFICE VISIT (OUTPATIENT)
Dept: FAMILY MEDICINE | Facility: CLINIC | Age: 55
End: 2025-04-15
Payer: COMMERCIAL

## 2025-04-15 VITALS
RESPIRATION RATE: 18 BRPM | WEIGHT: 217 LBS | HEART RATE: 81 BPM | DIASTOLIC BLOOD PRESSURE: 70 MMHG | BODY MASS INDEX: 34.87 KG/M2 | SYSTOLIC BLOOD PRESSURE: 124 MMHG | HEIGHT: 66 IN | OXYGEN SATURATION: 98 %

## 2025-04-15 DIAGNOSIS — T75.3XXA MOTION SICKNESS, INITIAL ENCOUNTER: ICD-10-CM

## 2025-04-15 DIAGNOSIS — Z00.00 ROUTINE PHYSICAL EXAMINATION: Primary | ICD-10-CM

## 2025-04-15 DIAGNOSIS — F51.01 PRIMARY INSOMNIA: ICD-10-CM

## 2025-04-15 DIAGNOSIS — F32.1 MAJOR DEPRESSIVE DISORDER, SINGLE EPISODE, MODERATE: ICD-10-CM

## 2025-04-15 DIAGNOSIS — Z13.31 POSITIVE DEPRESSION SCREENING: ICD-10-CM

## 2025-04-15 DIAGNOSIS — F41.9 ANXIETY: ICD-10-CM

## 2025-04-15 PROCEDURE — 99396 PREV VISIT EST AGE 40-64: CPT | Mod: S$GLB,,, | Performed by: PHYSICIAN ASSISTANT

## 2025-04-15 PROCEDURE — 3074F SYST BP LT 130 MM HG: CPT | Mod: CPTII,S$GLB,, | Performed by: PHYSICIAN ASSISTANT

## 2025-04-15 PROCEDURE — 3078F DIAST BP <80 MM HG: CPT | Mod: CPTII,S$GLB,, | Performed by: PHYSICIAN ASSISTANT

## 2025-04-15 PROCEDURE — 3008F BODY MASS INDEX DOCD: CPT | Mod: CPTII,S$GLB,, | Performed by: PHYSICIAN ASSISTANT

## 2025-04-15 PROCEDURE — 1159F MED LIST DOCD IN RCRD: CPT | Mod: CPTII,S$GLB,, | Performed by: PHYSICIAN ASSISTANT

## 2025-04-15 RX ORDER — SCOPOLAMINE 1 MG/3D
1 PATCH, EXTENDED RELEASE TRANSDERMAL
Qty: 4 PATCH | Refills: 2 | Status: SHIPPED | OUTPATIENT
Start: 2025-04-15 | End: 2025-05-21

## 2025-04-15 RX ORDER — ESTRADIOL 2 MG/1
2 TABLET ORAL
COMMUNITY
Start: 2025-03-05

## 2025-04-15 RX ORDER — LORAZEPAM 0.5 MG/1
0.5 TABLET ORAL 2 TIMES DAILY PRN
Qty: 60 TABLET | Refills: 5 | Status: SHIPPED | OUTPATIENT
Start: 2025-04-15 | End: 2025-10-12

## 2025-04-15 RX ORDER — ESCITALOPRAM OXALATE 10 MG/1
10 TABLET ORAL DAILY
Qty: 90 TABLET | Refills: 1 | Status: SHIPPED | OUTPATIENT
Start: 2025-04-15 | End: 2025-10-12

## 2025-04-15 RX ORDER — BUTALBITAL, ASPIRIN, AND CAFFEINE 325; 50; 40 MG/1; MG/1; MG/1
1 CAPSULE ORAL EVERY 4 HOURS PRN
COMMUNITY
Start: 2024-11-01

## 2025-04-15 NOTE — PROGRESS NOTES
SUBJECTIVE:    Patient ID: Sakshi Horner is a 55 y.o. female.    Chief Complaint: Follow-up (No bottles// refills needed// pt states she's concerned of her recent weight gain also having knee pain due to the weight //pt refused colon cancer screening due to insurance ending PHQ-9=15//last taken ativan was last night )    History of Present Illness    CHIEF COMPLAINT:  Sakshi presents today with concerns about weight gain and menopausal symptoms    WEIGHT MANAGEMENT:  She reports an upward weight trend causing clothes to no longer fit. Despite following diets for 2-3 weeks, she has experienced weight gain instead of loss. She expresses concern about walking during an upcoming trip to [facility] due to knee pain, which she attributes to the weight gain.    MENOPAUSAL SYMPTOMS:  She experiences hot flashes, mood swings, and trouble sleeping. She takes Estradiol 2mg daily with some symptom improvement. She was prescribed testosterone cream but has not used it due to medication concerns.    MENTAL HEALTH:  She reports depression symptoms and describes feeling miserable. She continues Lexapro for management. She reports significant social stress after an incident resulting in isolation from friends, though notes having a supportive .    SURGICAL HISTORY:  She recently underwent an implant removal procedure performed by Dr. Jesus Denton at [facility]. The procedure was conducted in-office under local anesthesia, with post-procedure pain reported.    LABS:  Most recent labs were performed with Dr. Goldstein, with previous labs dating back to August 2013.      ROS:  General: -fever, -chills, -fatigue, +weight gain, -weight loss, +hot flashes  Eyes: -vision changes, -redness, -discharge  ENT: -ear pain, -nasal congestion, -sore throat  Cardiovascular: -chest pain, -palpitations, -lower extremity edema  Respiratory: -cough, -shortness of breath  Gastrointestinal: -abdominal pain, -nausea, -vomiting, -diarrhea,  -constipation, -blood in stool  Genitourinary: -dysuria, -hematuria, -frequency  Musculoskeletal: +joint pain, -muscle pain, +limb pain  Skin: -rash, -lesion  Neurological: -headache, -dizziness, -numbness, -tingling  Psychiatric: -anxiety, +depression, +sleep difficulty, +mood swings         No visits with results within 6 Month(s) from this visit.   Latest known visit with results is:   Orders Only on 08/18/2023   Component Date Value Ref Range Status    Cholesterol 08/18/2023 180  <200 mg/dL Final    HDL 08/18/2023 56  > OR = 50 mg/dL Final    Triglycerides 08/18/2023 75  <150 mg/dL Final    LDL Cholesterol 08/18/2023 108 (H)  mg/dL (calc) Final    HDL/Cholesterol Ratio 08/18/2023 3.2  <5.0 (calc) Final    Non HDL Chol. (LDL+VLDL) 08/18/2023 124  <130 mg/dL (calc) Final    Glucose 08/18/2023 79  65 - 99 mg/dL Final    BUN 08/18/2023 23  7 - 25 mg/dL Final    Creatinine 08/18/2023 0.98  0.50 - 1.03 mg/dL Final    eGFR 08/18/2023 69  > OR = 60 mL/min/1.73m2 Final    BUN/Creatinine Ratio 08/18/2023 SEE NOTE:  6 - 22 (calc) Final    Sodium 08/18/2023 141  135 - 146 mmol/L Final    Potassium 08/18/2023 4.6  3.5 - 5.3 mmol/L Final    Chloride 08/18/2023 106  98 - 110 mmol/L Final    CO2 08/18/2023 23  20 - 32 mmol/L Final    Calcium 08/18/2023 9.8  8.6 - 10.4 mg/dL Final    Total Protein 08/18/2023 7.1  6.1 - 8.1 g/dL Final    Albumin 08/18/2023 4.5  3.6 - 5.1 g/dL Final    Globulin, Total 08/18/2023 2.6  1.9 - 3.7 g/dL (calc) Final    Albumin/Globulin Ratio 08/18/2023 1.7  1.0 - 2.5 (calc) Final    Total Bilirubin 08/18/2023 0.7  0.2 - 1.2 mg/dL Final    Alkaline Phosphatase 08/18/2023 64  37 - 153 U/L Final    AST 08/18/2023 16  10 - 35 U/L Final    ALT 08/18/2023 15  6 - 29 U/L Final    Color, UA 08/18/2023 YELLOW  YELLOW Final    Appearance, UA 08/18/2023 CLOUDY (A)  CLEAR Final    Specific Gravity, UA 08/18/2023 1.016  1.001 - 1.035 Final    pH, UA 08/18/2023 5.5  5.0 - 8.0 Final    Glucose, UA 08/18/2023 NEGATIVE   NEGATIVE Final    Bilirubin, UA 08/18/2023 NEGATIVE  NEGATIVE Final    Ketones, UA 08/18/2023 NEGATIVE  NEGATIVE Final    Occult Blood UA 08/18/2023 NEGATIVE  NEGATIVE Final    Protein, UA 08/18/2023 NEGATIVE  NEGATIVE Final    Nitrite, UA 08/18/2023 POSITIVE (A)  NEGATIVE Final    Leukocytes, UA 08/18/2023 1+ (A)  NEGATIVE Final    WBC Casts, UA 08/18/2023 0-5  < OR = 5 /HPF Final    RBC Casts, UA 08/18/2023 NONE SEEN  < OR = 2 /HPF Final    Squam Epithel, UA 08/18/2023 0-5  < OR = 5 /HPF Final    Bacteria, UA 08/18/2023 MANY (A)  NONE SEEN /HPF Final    Hyaline Casts, UA 08/18/2023 NONE SEEN  NONE SEEN /LPF Final    Service Cmt: 08/18/2023    Final    Reflexive Urine Culture 08/18/2023    Final    Urine Culture, Routine 08/18/2023  (A)   Final    WBC 08/18/2023 7.8  3.8 - 10.8 Thousand/uL Final    RBC 08/18/2023 5.34 (H)  3.80 - 5.10 Million/uL Final    Hemoglobin 08/18/2023 14.8  11.7 - 15.5 g/dL Final    Hematocrit 08/18/2023 45.5 (H)  35.0 - 45.0 % Final    MCV 08/18/2023 85.2  80.0 - 100.0 fL Final    MCH 08/18/2023 27.7  27.0 - 33.0 pg Final    MCHC 08/18/2023 32.5  32.0 - 36.0 g/dL Final    RDW 08/18/2023 13.5  11.0 - 15.0 % Final    Platelets 08/18/2023 336  140 - 400 Thousand/uL Final    MPV 08/18/2023 10.2  7.5 - 12.5 fL Final    Neutrophils, Abs 08/18/2023 3,783  1,500 - 7,800 cells/uL Final    Lymph # 08/18/2023 3,159  850 - 3,900 cells/uL Final    Mono # 08/18/2023 523  200 - 950 cells/uL Final    Eos # 08/18/2023 273  15 - 500 cells/uL Final    Baso # 08/18/2023 62  0 - 200 cells/uL Final    Neutrophils Relative 08/18/2023 48.5  % Final    Lymph % 08/18/2023 40.5  % Final    Mono % 08/18/2023 6.7  % Final    Eosinophil % 08/18/2023 3.5  % Final    Basophil % 08/18/2023 0.8  % Final    TSH w/reflex to FT4 08/18/2023 0.97  mIU/L Final       Past Medical History:   Diagnosis Date    Anemia     Anxiety     Arthritis     Depression     Encounter for blood transfusion 01/12/2009    iron deficency     "Osteochondroma of right tibia      Past Surgical History:   Procedure Laterality Date     SECTION      COLONOSCOPY N/A 2022    Procedure: COLONOSCOPY;  Surgeon: Brian Clements MD;  Location: [facility];  Service: Endoscopy;  Laterality: N/A;    COSMETIC SURGERY Bilateral     Breast implants    GASTRIC BYPASS      HYSTERECTOMY      KNEE SURGERY Right     OsteoChondroma removed     Family History   Problem Relation Name Age of Onset    Diabetes Mother      No Known Problems Father         Marital Status:   Alcohol History:  reports current alcohol use.  Tobacco History:  reports that she has never smoked. She has never used smokeless tobacco.  Drug History:  reports no history of drug use.    Review of patient's allergies indicates:   Allergen Reactions    Codeine Other (See Comments)     nightmares    Trazodone Other (See Comments)     nightmares     Current Medications[1]    Objective:      Vitals:    04/15/25 0747   BP: 124/70   Pulse: 81   Resp: 18   SpO2: 98%   Weight: 98.4 kg (217 lb)   Height: 5' 6" (1.676 m)     Physical Exam    Vitals: Blood pressure: 124/70.  General: No acute distress. Well-developed. Well-nourished.  Eyes: EOMI. Sclerae anicteric.  HENT: Normocephalic. Atraumatic. Nares patent. Moist oral mucosa.  Ears: Bilateral TMs clear. Bilateral EACs clear.  Cardiovascular: Regular rate. Regular rhythm. No murmurs. No rubs. No gallops. Normal S1, S2.  Respiratory: Normal respiratory effort. Clear to auscultation bilaterally. No rales. No rhonchi. No wheezing.  Abdomen: Soft. Non-tender. Non-distended. Normoactive bowel sounds.  Musculoskeletal: No  obvious deformity.  Extremities: No lower extremity edema.  Neurological: Alert & oriented x3. No slurred speech. Normal gait.  Psychiatric: Normal mood. Normal affect. Good insight. Good judgment.  Skin: Warm. Dry. No rash.         Assessment:       Assessment & Plan    - Assessed weight gain concerns and menopausal " symptoms.  - Considered metabolic factors, including thyroid function, as potential contributors to weight gain.  - Evaluated current medications, noting Lexapro as weight-neutral.  - Discussed potential use of GLP-1 receptor agonists (e.g., ZepBound or Wegovy) for weight management if labs are normal, including mechanism of action, effectiveness, and importance of maintaining weight loss after discontinuation.  - Noted Dr. Goldstein's previous prescription of low-dose Estradiol for menopausal symptoms.  - Considered potential benefits of low-dose testosterone therapy, as previously prescribed by Dr. Goldstein.  - Explained recent shift in approach to treating perimenopause symptoms with hormones, noting balance between symptom relief and potential risks.    MAJOR DEPRESSIVE DISORDER, SINGLE EPISODE, MODERATE:  - Monitor patient's reported depressive symptoms, including feeling miserable, mood swings, trouble sleeping, and stress in relationships.  - Acknowledge the impact of these symptoms on daily life, as well as the patient's concerns about various life stressors such as financial issues, family conflicts, and weight gain.    MENOPAUSAL AND FEMALE CLIMACTERIC STATES:  - Monitor patient's menopausal symptoms including hot flashes.  - Continue Estradiol 2mg daily prescribed by Dr. Goldstein, noting some improvement in symptoms.  - Discuss current approach to hormone therapy for perimenopause, acknowledging its benefits when used safely.  - Consider adding low dose testosterone, which was previously prescribed but not used by the patient.    KNEE PAIN:  - Monitor reported knee pain, likely related to weight gain.  - Recommend gradually increasing aerobic exercise, taking into account knee issues.    ABNORMAL WEIGHT GAIN:  - Address significant weight gain affecting clothes fit and mobility.  - Order comprehensive labs including thyroid function, metabolic factors, and A1C to check for insulin resistance and diabetes.  -  Refer to in-house dietitian for 60-minute consultation for personalized nutrition advice.  - Discuss possibility of using GLP-1 injections (ZepBound or Wegovy) for weight loss if labs looks good.  - Instruct patient to contact the office if interested in trying GLP-1 agonist injections.    PERSONAL HISTORY OF BREAST IMPLANT REMOVAL:  - Note recent removal of breast implants in an office procedure.  - Sakshi reports pain after the procedure but overall satisfaction with the results.    GENERAL HEALTH MONITORING:  - Record blood pressure at 124/70.    MISCELLANEOUS:  - Prescribe scopolamine patches for upcoming cruise.  - Offer to message office if patient wants referral to pulmonologist for 's disability evaluation.    FOLLOW-UP:  - Review lab results in the next couple of days.       Plan:       Routine physical examination  -     CBC W/ AUTO DIFFERENTIAL; Future; Expected date: 04/15/2025  -     COMPREHENSIVE METABOLIC PANEL; Future; Expected date: 04/15/2025  -     Lipid Panel; Future; Expected date: 04/15/2025  -     TSH w/reflex to FT4; Future; Expected date: 04/15/2025  -     Urinalysis, Reflex to Urine Culture; Future; Expected date: 04/15/2025  -     HEMOGLOBIN A1C; Future; Expected date: 04/15/2025  -     HEPATITIS C ANTIBODY; Future; Expected date: 04/15/2025  -     HIV 1/2 Ag/Ab (4th Gen); Future; Expected date: 04/15/2025    Anxiety  Comments:  Going to therapy and taking ativan prn. very happy with the lexapro at the current dose. continue as is.  Orders:  -     EScitalopram oxalate (LEXAPRO) 10 MG tablet; Take 1 tablet (10 mg total) by mouth once daily.  Dispense: 90 tablet; Refill: 1  -     LORazepam (ATIVAN) 0.5 MG tablet; Take 1 tablet (0.5 mg total) by mouth 2 (two) times daily as needed for Anxiety.  Dispense: 60 tablet; Refill: 5  -     DRUG MONITOR, PANEL 4, W/CONF, URINE; Future; Expected date: 04/15/2025    Primary insomnia  Comments:  She is doing well with the ativan prn at night.  refills as needed.  Orders:  -     LORazepam (ATIVAN) 0.5 MG tablet; Take 1 tablet (0.5 mg total) by mouth 2 (two) times daily as needed for Anxiety.  Dispense: 60 tablet; Refill: 5    Motion sickness, initial encounter  -     scopolamine (TRANSDERM-SCOP) 1.3-1.5 mg (1 mg over 3 days); Place 1 patch onto the skin every 72 hours.  Dispense: 4 patch; Refill: 2    Major depressive disorder, single episode, moderate    Positive depression screening  Comments:  I have reviewed the positive depression score which warrants active treatment with psychotherapy and/or medications.      Follow up in about 6 months (around 10/15/2025).    This note was generated with the assistance of ambient listening technology. Verbal consent was obtained by the patient and accompanying visitor(s) for the recording of patient appointment to facilitate this note. I attest to having reviewed and edited the generated note for accuracy, though some syntax or spelling errors may persist. Please contact the author of this note for any clarification.          4/15/2025 Luis Alberto Guzman PA-C      I have reviewed the positive depression score which warrants active treatment with psychotherapy and/or medications.          [1]   Current Outpatient Medications:     butalbital-aspirin-caffeine -40 mg (FIORINAL) -40 mg Cap, Take 1 capsule by mouth every 4 (four) hours as needed., Disp: , Rfl:     cetirizine (ZYRTEC) 10 MG tablet, Take 10 mg by mouth once daily., Disp: , Rfl:     cyclobenzaprine (FLEXERIL) 10 MG tablet, Take 10 mg by mouth., Disp: , Rfl:     estradioL (ESTRACE) 2 MG tablet, Take 2 mg by mouth., Disp: , Rfl:     EScitalopram oxalate (LEXAPRO) 10 MG tablet, Take 1 tablet (10 mg total) by mouth once daily., Disp: 90 tablet, Rfl: 1    LORazepam (ATIVAN) 0.5 MG tablet, Take 1 tablet (0.5 mg total) by mouth 2 (two) times daily as needed for Anxiety., Disp: 60 tablet, Rfl: 5    scopolamine (TRANSDERM-SCOP) 1.3-1.5 mg (1 mg over 3  days), Place 1 patch onto the skin every 72 hours., Disp: 4 patch, Rfl: 2

## 2025-04-19 LAB
1OH-MIDAZOLAM UR-MCNC: NEGATIVE NG/ML
7AMINOCLONAZEPAM UR-MCNC: NEGATIVE NG/ML
A-OH ALPRAZ UR-MCNC: NEGATIVE NG/ML
A-OH-TRIAZOLAM UR-MCNC: NEGATIVE NG/ML
ALBUMIN SERPL-MCNC: 3.9 G/DL (ref 3.6–5.1)
ALBUMIN/GLOB SERPL: 1.3 (CALC) (ref 1–2.5)
ALP SERPL-CCNC: 72 U/L (ref 37–153)
ALT SERPL-CCNC: 14 U/L (ref 6–29)
AMOBARBITAL UR-MCNC: NEGATIVE NG/ML
AMPHETAMINES UR QL: NEGATIVE NG/ML
APPEARANCE UR: CLEAR
AST SERPL-CCNC: 19 U/L (ref 10–35)
BACTERIA #/AREA URNS HPF: ABNORMAL /HPF
BACTERIA UR CULT: ABNORMAL
BACTERIA UR CULT: ABNORMAL
BARBITURATES UR QL: POSITIVE NG/ML
BASOPHILS # BLD AUTO: 86 CELLS/UL (ref 0–200)
BASOPHILS NFR BLD AUTO: 1.2 %
BENZODIAZ UR QL: POSITIVE NG/ML
BILIRUB SERPL-MCNC: 0.5 MG/DL (ref 0.2–1.2)
BILIRUB UR QL STRIP: NEGATIVE
BUN SERPL-MCNC: 12 MG/DL (ref 7–25)
BUN/CREAT SERPL: NORMAL (CALC) (ref 6–22)
BUTALBITAL UR-MCNC: 117 NG/ML
BZE UR QL: NEGATIVE NG/ML
CALCIUM SERPL-MCNC: 8.8 MG/DL (ref 8.6–10.4)
CHLORIDE SERPL-SCNC: 103 MMOL/L (ref 98–110)
CHOLEST SERPL-MCNC: 222 MG/DL
CHOLEST/HDLC SERPL: 3.1 (CALC)
CO2 SERPL-SCNC: 27 MMOL/L (ref 20–32)
COLOR UR: YELLOW
CREAT SERPL-MCNC: 0.89 MG/DL (ref 0.5–1.03)
CREAT UR-MCNC: 159.1 MG/DL
DRUG SCREEN COMMENT UR-IMP: ABNORMAL
DRUG SCREEN COMMENT UR-IMP: ABNORMAL
EGFR: 77 ML/MIN/1.73M2
EOSINOPHIL # BLD AUTO: 317 CELLS/UL (ref 15–500)
EOSINOPHIL NFR BLD AUTO: 4.4 %
ERYTHROCYTE [DISTWIDTH] IN BLOOD BY AUTOMATED COUNT: 14.1 % (ref 11–15)
GLOBULIN SER CALC-MCNC: 3 G/DL (CALC) (ref 1.9–3.7)
GLUCOSE SERPL-MCNC: 86 MG/DL (ref 65–99)
GLUCOSE UR QL STRIP: NEGATIVE
HBA1C MFR BLD: 5.5 %
HCT VFR BLD AUTO: 44.2 % (ref 35–45)
HCV AB SERPL QL IA: NORMAL
HDLC SERPL-MCNC: 71 MG/DL
HGB BLD-MCNC: 13.8 G/DL (ref 11.7–15.5)
HGB UR QL STRIP: NEGATIVE
HIV 1+2 AB+HIV1 P24 AG SERPL QL IA: NORMAL
HYALINE CASTS #/AREA URNS LPF: ABNORMAL /LPF
KETONES UR QL STRIP: NEGATIVE
LDLC SERPL CALC-MCNC: 122 MG/DL (CALC)
LEUKOCYTE ESTERASE UR QL STRIP: ABNORMAL
LORAZEPAM UR-MCNC: 910 NG/ML
LYMPHOCYTES # BLD AUTO: 2563 CELLS/UL (ref 850–3900)
LYMPHOCYTES NFR BLD AUTO: 35.6 %
MCH RBC QN AUTO: 26.6 PG (ref 27–33)
MCHC RBC AUTO-ENTMCNC: 31.2 G/DL (ref 32–36)
MCV RBC AUTO: 85.3 FL (ref 80–100)
METHADONE UR QL: NEGATIVE NG/ML
MONOCYTES # BLD AUTO: 432 CELLS/UL (ref 200–950)
MONOCYTES NFR BLD AUTO: 6 %
NEUTROPHILS # BLD AUTO: 3802 CELLS/UL (ref 1500–7800)
NEUTROPHILS NFR BLD AUTO: 52.8 %
NITRITE UR QL STRIP: NEGATIVE
NONHDLC SERPL-MCNC: 151 MG/DL (CALC)
NORDIAZEPAM UR-MCNC: NEGATIVE NG/ML
NOTES AND COMMENTS: ABNORMAL
OH-ETHYLFLURAZ UR-MCNC: NEGATIVE NG/ML
OPIATES UR QL: NEGATIVE NG/ML
OXAZEPAM UR-MCNC: NEGATIVE NG/ML
OXIDANTS UR QL: NEGATIVE MCG/ML
OXYCODONE UR QL: NEGATIVE NG/ML
PCP UR QL: NEGATIVE NG/ML
PENTOBARB UR-MCNC: NEGATIVE NG/ML
PH UR STRIP: 6 [PH] (ref 5–8)
PH UR: 5.6 [PH] (ref 4.5–9)
PHENOBARB UR-MCNC: NEGATIVE NG/ML
PLATELET # BLD AUTO: 382 THOUSAND/UL (ref 140–400)
PMV BLD REES-ECKER: 10.3 FL (ref 7.5–12.5)
POTASSIUM SERPL-SCNC: 4.4 MMOL/L (ref 3.5–5.3)
PROT SERPL-MCNC: 6.9 G/DL (ref 6.1–8.1)
PROT UR QL STRIP: NEGATIVE
RBC # BLD AUTO: 5.18 MILLION/UL (ref 3.8–5.1)
RBC #/AREA URNS HPF: ABNORMAL /HPF
SECOBARBITAL UR-MCNC: NEGATIVE NG/ML
SERVICE CMNT-IMP: ABNORMAL
SODIUM SERPL-SCNC: 138 MMOL/L (ref 135–146)
SP GR UR STRIP: 1.02 (ref 1–1.03)
SQUAMOUS #/AREA URNS HPF: ABNORMAL /HPF
TEMAZEPAM UR-MCNC: NEGATIVE NG/ML
TRIGL SERPL-MCNC: 170 MG/DL
TSH SERPL-ACNC: 2.05 MIU/L
WBC # BLD AUTO: 7.2 THOUSAND/UL (ref 3.8–10.8)
WBC #/AREA URNS HPF: ABNORMAL /HPF

## 2025-04-21 DIAGNOSIS — N39.0 URINARY TRACT INFECTION WITHOUT HEMATURIA, SITE UNSPECIFIED: Primary | ICD-10-CM

## 2025-04-21 RX ORDER — CIPROFLOXACIN 500 MG/1
500 TABLET ORAL EVERY 12 HOURS
Qty: 10 TABLET | Refills: 0 | Status: SHIPPED | OUTPATIENT
Start: 2025-04-21

## 2025-04-21 NOTE — TELEPHONE ENCOUNTER
Denies symptoms but she was tearful during the call, asked if she were okay, said her dad just passed away this morning, so if Gomez wants to send a rx in case she develops symptoms he could

## 2025-04-21 NOTE — TELEPHONE ENCOUNTER
----- Message from Med Assistant Caal sent at 4/21/2025  9:58 AM CDT -----  Urine culture + > 100,000 CFU/mL enterobacter cloacae complex

## 2025-04-30 ENCOUNTER — TELEPHONE (OUTPATIENT)
Dept: FAMILY MEDICINE | Facility: CLINIC | Age: 55
End: 2025-04-30
Payer: COMMERCIAL

## 2025-05-08 ENCOUNTER — PATIENT MESSAGE (OUTPATIENT)
Dept: FAMILY MEDICINE | Facility: CLINIC | Age: 55
End: 2025-05-08
Payer: COMMERCIAL

## 2025-05-08 DIAGNOSIS — Z12.11 SPECIAL SCREENING FOR MALIGNANT NEOPLASMS, COLON: Primary | ICD-10-CM

## 2025-05-19 ENCOUNTER — PATIENT MESSAGE (OUTPATIENT)
Dept: GASTROENTEROLOGY | Facility: CLINIC | Age: 55
End: 2025-05-19
Payer: COMMERCIAL

## 2025-05-19 ENCOUNTER — PATIENT MESSAGE (OUTPATIENT)
Dept: FAMILY MEDICINE | Facility: CLINIC | Age: 55
End: 2025-05-19
Payer: COMMERCIAL

## 2025-05-19 DIAGNOSIS — M62.838 MUSCLE SPASM: Primary | ICD-10-CM

## 2025-05-19 DIAGNOSIS — M25.569 KNEE PAIN, UNSPECIFIED CHRONICITY, UNSPECIFIED LATERALITY: ICD-10-CM

## 2025-05-20 RX ORDER — CYCLOBENZAPRINE HCL 10 MG
10 TABLET ORAL 3 TIMES DAILY PRN
Qty: 30 TABLET | Refills: 1 | Status: SHIPPED | OUTPATIENT
Start: 2025-05-20

## 2025-05-22 DIAGNOSIS — M25.569 KNEE PAIN, UNSPECIFIED CHRONICITY, UNSPECIFIED LATERALITY: Primary | ICD-10-CM

## 2025-05-26 ENCOUNTER — HOSPITAL ENCOUNTER (OUTPATIENT)
Dept: RADIOLOGY | Facility: HOSPITAL | Age: 55
Discharge: HOME | End: 2025-05-26
Attending: ORTHOPAEDIC SURGERY
Payer: COMMERCIAL

## 2025-05-26 ENCOUNTER — OFFICE VISIT (OUTPATIENT)
Dept: ORTHOPEDICS | Facility: CLINIC | Age: 55
End: 2025-05-26
Payer: COMMERCIAL

## 2025-05-26 VITALS — HEIGHT: 66 IN | BODY MASS INDEX: 34.86 KG/M2 | RESPIRATION RATE: 16 BRPM | WEIGHT: 216.94 LBS

## 2025-05-26 DIAGNOSIS — M17.11 PATELLOFEMORAL ARTHRITIS OF RIGHT KNEE: Primary | ICD-10-CM

## 2025-05-26 DIAGNOSIS — M25.569 KNEE PAIN, UNSPECIFIED CHRONICITY, UNSPECIFIED LATERALITY: ICD-10-CM

## 2025-05-26 PROCEDURE — 20610 DRAIN/INJ JOINT/BURSA W/O US: CPT | Mod: RT,S$GLB,, | Performed by: ORTHOPAEDIC SURGERY

## 2025-05-26 PROCEDURE — 3044F HG A1C LEVEL LT 7.0%: CPT | Mod: CPTII,S$GLB,, | Performed by: ORTHOPAEDIC SURGERY

## 2025-05-26 PROCEDURE — 99999 PR PBB SHADOW E&M-EST. PATIENT-LVL III: CPT | Mod: PBBFAC,,, | Performed by: ORTHOPAEDIC SURGERY

## 2025-05-26 PROCEDURE — 1160F RVW MEDS BY RX/DR IN RCRD: CPT | Mod: CPTII,S$GLB,, | Performed by: ORTHOPAEDIC SURGERY

## 2025-05-26 PROCEDURE — 73564 X-RAY EXAM KNEE 4 OR MORE: CPT | Mod: TC,50,PO

## 2025-05-26 PROCEDURE — 3008F BODY MASS INDEX DOCD: CPT | Mod: CPTII,S$GLB,, | Performed by: ORTHOPAEDIC SURGERY

## 2025-05-26 PROCEDURE — 1159F MED LIST DOCD IN RCRD: CPT | Mod: CPTII,S$GLB,, | Performed by: ORTHOPAEDIC SURGERY

## 2025-05-26 PROCEDURE — 99204 OFFICE O/P NEW MOD 45 MIN: CPT | Mod: 25,S$GLB,, | Performed by: ORTHOPAEDIC SURGERY

## 2025-05-26 PROCEDURE — 73564 X-RAY EXAM KNEE 4 OR MORE: CPT | Mod: 26,,, | Performed by: RADIOLOGY

## 2025-05-26 RX ORDER — TRIAMCINOLONE ACETONIDE 40 MG/ML
40 INJECTION, SUSPENSION INTRA-ARTICULAR; INTRAMUSCULAR
Status: DISCONTINUED | OUTPATIENT
Start: 2025-05-26 | End: 2025-05-26 | Stop reason: HOSPADM

## 2025-05-26 RX ADMIN — TRIAMCINOLONE ACETONIDE 40 MG: 40 INJECTION, SUSPENSION INTRA-ARTICULAR; INTRAMUSCULAR at 02:05

## 2025-05-26 NOTE — PROCEDURES
Large Joint Aspiration/Injection: R knee    Date/Time: 5/26/2025 2:15 PM    Performed by: Octavio Avila MD  Authorized by: Octavio Avila MD    Consent Done?:  Yes (Verbal)  Indications:  Pain  Site marked: the procedure site was marked    Timeout: prior to procedure the correct patient, procedure, and site was verified    Local anesthetic: Ropivicaine.  Anesthetic total (ml):  3      Details:  Needle Size:  20 G  Approach:  Anterolateral  Location:  Knee  Site:  R knee  Medications:  40 mg triamcinolone acetonide 40 mg/mL  Patient tolerance:  Patient tolerated the procedure well with no immediate complications

## 2025-05-26 NOTE — PROGRESS NOTES
Past Medical History:   Diagnosis Date    Anemia     Anxiety     Arthritis     Depression     Encounter for blood transfusion 2009    iron deficency    Osteochondroma of right tibia        Past Surgical History:   Procedure Laterality Date     SECTION      COLONOSCOPY N/A 2022    Procedure: COLONOSCOPY;  Surgeon: Brian Clements MD;  Location: [facility];  Service: Endoscopy;  Laterality: N/A;    COSMETIC SURGERY Bilateral     Breast implants    GASTRIC BYPASS      HYSTERECTOMY      KNEE SURGERY Right     OsteoChondroma removed       Current Outpatient Medications   Medication Sig    butalbital-aspirin-caffeine -40 mg (FIORINAL) -40 mg Cap Take 1 capsule by mouth every 4 (four) hours as needed.    cetirizine (ZYRTEC) 10 MG tablet Take 10 mg by mouth once daily.    ciprofloxacin HCl (CIPRO) 500 MG tablet Take 1 tablet (500 mg total) by mouth every 12 (twelve) hours.    cyclobenzaprine (FLEXERIL) 10 MG tablet Take 1 tablet (10 mg total) by mouth 3 (three) times daily as needed for Muscle spasms.    EScitalopram oxalate (LEXAPRO) 10 MG tablet Take 1 tablet (10 mg total) by mouth once daily.    estradioL (ESTRACE) 2 MG tablet Take 2 mg by mouth.    LORazepam (ATIVAN) 0.5 MG tablet Take 1 tablet (0.5 mg total) by mouth 2 (two) times daily as needed for Anxiety.     No current facility-administered medications for this visit.       Review of patient's allergies indicates:   Allergen Reactions    Codeine Other (See Comments)     nightmares    Trazodone Other (See Comments)     nightmares       Family History   Problem Relation Name Age of Onset    Diabetes Mother      No Known Problems Father         Social History[1]    Chief Complaint: No chief complaint on file.      History of present illness:  This is a 55-year-old female seen for several months of right knee pain.  Pain is around the front of the knee.  Hurts going down stairs.  Patient did have surgery on it back in .   They are about to go to [facility] in his concerned about all the hills and steps.  Pain is as high as a 7/10.  Denies a lot of swelling or mechanical symptoms.    Prior treatment:  Surgery in over-the-counter NSAIDs        Review of Systems:    Constitution: Negative for chills, fever, and sweats.  Negative for unexplained weight loss.    HENT:  Negative for headaches and blurry vision.    Cardiovascular:Negative for chest pain or irregular heart beat. Negative for hypertension.    Respiratory:  Negative for cough and shortness of breath.    Gastrointestinal: Negative for abdominal pain, heartburn, melena, nausea, and vomitting.    Genitourinary:  Negative bladder incontinence and dysuria.    Musculoskeletal:  See HPI    Neurological: Negative for numbness.    Psychiatric/Behavioral: Negative for depression.  The patient is not nervous/anxious.      Endocrine: Negative for polyuria    Hematologic/Lymphatic: Negative for bleeding problem.  Does not bruise/bleed easily.    Skin: Negative for poor would healing and rash      Physical Examination:    Vital Signs:    Vitals:    05/26/25 1432   Resp: 16       Body mass index is 35.01 kg/m².    This a well-developed, well nourished patient in no acute distress.  They are alert and oriented and cooperative to examination.  Pt. walks without an antalgic gait.      Examination of the right knee shows no rashes or erythema. There are no masses ecchymosis or effusion. Patient has full range of motion from 0-130°. Patient is nontender to palpation over lateral joint line and nontender to palpation over the medial joint line. Patient has a - Lachman exam, - anterior drawer exam, and - posterior drawer exam. - Apley exam. Knee is stable to varus and valgus stress. 5 out of 5 motor strength. Palpable distal pulses. Intact light touch sensation.  Mild Patellofemoral crepitus      X-rays:  X-rays of both knees are ordered and review which show well-maintained joint space.  Mild  patellofemoral spurring         Assessment:  Right patellofemoral arthritis    Plan:  I reviewed the findings with her and her  today.  Patient has some patellofemoral arthritis.  Offered him a cortisone injection to hopefully help on her vacation.  Patient agreed.            All previous pertinent notes including ER visits, physical therapy visits, other orthopedic visits as well as other care for the same musculoskeletal problem were reviewed.  All pertinent lab values and previous imaging was reviewed pertinent to the current visit.    This note was created using [employer] voice recognition software that occasionally misinterpreted phrases or words.    Consult note is delivered via Epic messaging service.           [1]   Social History  Socioeconomic History    Marital status:    Tobacco Use    Smoking status: Never    Smokeless tobacco: Never   Substance and Sexual Activity    Alcohol use: Yes     Comment: socially    Drug use: No     Social Drivers of Health     Financial Resource Strain: Low Risk  (4/14/2025)    Overall Financial Resource Strain (CARDIA)     Difficulty of Paying Living Expenses: Not very hard   Food Insecurity: Food Insecurity Present (4/14/2025)    Hunger Vital Sign     Worried About Running Out of Food in the Last Year: Never true     Ran Out of Food in the Last Year: Sometimes true   Transportation Needs: No Transportation Needs (4/14/2025)    PRAPARE - Transportation     Lack of Transportation (Medical): No     Lack of Transportation (Non-Medical): No   Physical Activity: Insufficiently Active (4/14/2025)    Exercise Vital Sign     Days of Exercise per Week: 2 days     Minutes of Exercise per Session: 20 min   Stress: Stress Concern Present (4/14/2025)    [employer] - Occupational Stress Questionnaire     Feeling of Stress : To some extent   Housing Stability: Low Risk  (4/14/2025)    Housing Stability Vital Sign     Unable to Pay for Housing in  the Last Year: No     Number of Times Moved in the Last Year: 0     Homeless in the Last Year: No

## 2025-08-21 ENCOUNTER — PATIENT MESSAGE (OUTPATIENT)
Dept: FAMILY MEDICINE | Facility: CLINIC | Age: 55
End: 2025-08-21
Payer: COMMERCIAL